# Patient Record
Sex: MALE | Race: WHITE | NOT HISPANIC OR LATINO | Employment: FULL TIME | URBAN - METROPOLITAN AREA
[De-identification: names, ages, dates, MRNs, and addresses within clinical notes are randomized per-mention and may not be internally consistent; named-entity substitution may affect disease eponyms.]

---

## 2017-05-30 ENCOUNTER — HISTORICAL (OUTPATIENT)
Dept: LAB | Facility: HOSPITAL | Age: 62
End: 2017-05-30

## 2017-05-30 LAB — PSA SERPL-MCNC: 1.68 NG/ML (ref 0–4)

## 2017-07-23 ENCOUNTER — OFFICE VISIT (OUTPATIENT)
Dept: URGENT CARE | Facility: CLINIC | Age: 62
End: 2017-07-23
Payer: COMMERCIAL

## 2017-07-23 VITALS
SYSTOLIC BLOOD PRESSURE: 142 MMHG | BODY MASS INDEX: 37.01 KG/M2 | WEIGHT: 235.81 LBS | OXYGEN SATURATION: 97 % | HEIGHT: 67 IN | HEART RATE: 85 BPM | TEMPERATURE: 99 F | DIASTOLIC BLOOD PRESSURE: 76 MMHG

## 2017-07-23 DIAGNOSIS — R31.9 URINARY TRACT INFECTION WITH HEMATURIA, SITE UNSPECIFIED: ICD-10-CM

## 2017-07-23 DIAGNOSIS — R10.9 ABDOMINAL PAIN, ACUTE: Primary | ICD-10-CM

## 2017-07-23 DIAGNOSIS — N39.0 URINARY TRACT INFECTION WITH HEMATURIA, SITE UNSPECIFIED: ICD-10-CM

## 2017-07-23 LAB
BILIRUB SERPL-MCNC: NEGATIVE MG/DL
BLOOD, POC UA: 250
GLUCOSE UR QL STRIP: NORMAL
KETONES UR QL STRIP: NEGATIVE
LEUKOCYTE ESTERASE URINE, POC: 2
NITRITE, POC UA: NEGATIVE
PH, POC UA: 7
PROTEIN, POC: ABNORMAL
SPECIFIC GRAVITY, POC UA: 1
UROBILINOGEN, POC UA: NORMAL

## 2017-07-23 PROCEDURE — 87186 SC STD MICRODIL/AGAR DIL: CPT

## 2017-07-23 PROCEDURE — 99203 OFFICE O/P NEW LOW 30 MIN: CPT | Mod: 25,S$GLB,, | Performed by: PHYSICIAN ASSISTANT

## 2017-07-23 PROCEDURE — 87077 CULTURE AEROBIC IDENTIFY: CPT

## 2017-07-23 PROCEDURE — 3008F BODY MASS INDEX DOCD: CPT | Mod: S$GLB,,, | Performed by: PHYSICIAN ASSISTANT

## 2017-07-23 PROCEDURE — 81000 URINALYSIS NONAUTO W/SCOPE: CPT | Mod: S$GLB,,, | Performed by: PHYSICIAN ASSISTANT

## 2017-07-23 PROCEDURE — 87088 URINE BACTERIA CULTURE: CPT

## 2017-07-23 PROCEDURE — 87086 URINE CULTURE/COLONY COUNT: CPT

## 2017-07-23 PROCEDURE — 99999 PR PBB SHADOW E&M-NEW PATIENT-LVL V: CPT | Mod: PBBFAC,,, | Performed by: PHYSICIAN ASSISTANT

## 2017-07-23 RX ORDER — LISINOPRIL 10 MG/1
TABLET ORAL
COMMUNITY
Start: 2017-06-01 | End: 2022-05-17 | Stop reason: DRUGHIGH

## 2017-07-23 RX ORDER — TAMSULOSIN HYDROCHLORIDE 0.4 MG/1
CAPSULE ORAL
COMMUNITY
Start: 2017-05-05

## 2017-07-23 RX ORDER — ASPIRIN 81 MG/1
81 TABLET ORAL DAILY
COMMUNITY

## 2017-07-23 RX ORDER — SILVER SULFADIAZINE 10 G/1000G
CREAM TOPICAL
COMMUNITY
Start: 2017-07-18 | End: 2022-11-30 | Stop reason: ALTCHOICE

## 2017-07-23 RX ORDER — LOVASTATIN 20 MG/1
TABLET ORAL
COMMUNITY
Start: 2017-07-23 | End: 2022-05-17 | Stop reason: DRUGHIGH

## 2017-07-23 RX ORDER — CLINDAMYCIN PHOSPHATE 11.9 MG/ML
SOLUTION TOPICAL 2 TIMES DAILY
COMMUNITY
End: 2023-12-04 | Stop reason: ALTCHOICE

## 2017-07-23 RX ORDER — POLYETHYLENE GLYCOL 3350 17 G/17G
17 POWDER, FOR SOLUTION ORAL 2 TIMES DAILY
Qty: 340 G | Refills: 0 | Status: SHIPPED | OUTPATIENT
Start: 2017-07-23 | End: 2017-08-02

## 2017-07-23 RX ORDER — CIPROFLOXACIN 500 MG/1
500 TABLET ORAL EVERY 12 HOURS
Qty: 20 TABLET | Refills: 0 | Status: SHIPPED | OUTPATIENT
Start: 2017-07-23 | End: 2017-08-02

## 2017-07-23 RX ORDER — METRONIDAZOLE 500 MG/1
500 TABLET ORAL EVERY 8 HOURS
Qty: 30 TABLET | Refills: 0 | Status: SHIPPED | OUTPATIENT
Start: 2017-07-23 | End: 2017-08-02

## 2017-07-23 RX ORDER — HYDROGEN PEROXIDE 3 %
20 SOLUTION, NON-ORAL MISCELLANEOUS
COMMUNITY

## 2017-07-23 NOTE — PROGRESS NOTES
Subjective:       Patient ID: Eugene Gayle is a 61 y.o. male.    Chief Complaint: Abdominal Pain    Abdominal Pain   This is a new problem. The current episode started in the past 7 days. The onset quality is gradual. The problem occurs constantly. The problem has been unchanged. The pain is located in the LLQ. The pain is moderate. The quality of the pain is aching. The abdominal pain does not radiate. Associated symptoms include dysuria, a fever, frequency, headaches and nausea. Pertinent negatives include no constipation, diarrhea, hematuria or melena. Exacerbated by: Ate tomatoes over the weekend. The pain is relieved by nothing. He has tried nothing for the symptoms.     Review of Systems   Constitutional: Positive for chills and fever. Negative for fatigue.   Respiratory: Negative for chest tightness and shortness of breath.    Cardiovascular: Negative for chest pain.   Gastrointestinal: Positive for abdominal pain and nausea. Negative for constipation, diarrhea and melena.   Genitourinary: Positive for dysuria and frequency. Negative for flank pain and hematuria.   Neurological: Positive for headaches.       Objective:      Physical Exam   Constitutional: He appears well-developed and well-nourished. No distress.   Cardiovascular: Normal rate and regular rhythm.  Exam reveals no gallop and no friction rub.    No murmur heard.  Pulmonary/Chest: Effort normal and breath sounds normal. No respiratory distress. He has no wheezes. He has no rales. He exhibits no tenderness.   Abdominal: Normal appearance and bowel sounds are normal. There is no hepatosplenomegaly. There is tenderness in the suprapubic area and left lower quadrant. There is no rigidity, no rebound, no guarding, no CVA tenderness, no tenderness at McBurney's point and negative Kelley's sign.   Skin: He is not diaphoretic.   Nursing note and vitals reviewed.      Assessment:       1. Abdominal pain, acute        Plan:       Abdominal pain,  acute  -     POCT URINALYSIS  -     Urine culture    Urinary tract infection with hematuria, site unspecified    Other orders  -     ciprofloxacin HCl (CIPRO) 500 MG tablet; Take 1 tablet (500 mg total) by mouth every 12 (twelve) hours.  Dispense: 20 tablet; Refill: 0  -     metronidazole (FLAGYL) 500 MG tablet; Take 1 tablet (500 mg total) by mouth every 8 (eight) hours.  Dispense: 30 tablet; Refill: 0  -     polyethylene glycol (GLYCOLAX) 17 gram/dose powder; Take 17 g by mouth 2 (two) times daily.  Dispense: 340 g; Refill: 0

## 2017-07-23 NOTE — PATIENT INSTRUCTIONS
Clear liquids to a soft bland diet as tolerated. Take tylenol as needed for fever. Avoid alcohol and coffee.  Go to the emergency room if symptoms do not improve in 24 to 48 hours.  Follow up with your doctor next week.

## 2017-07-25 LAB — BACTERIA UR CULT: NORMAL

## 2017-09-03 ENCOUNTER — HOSPITAL ENCOUNTER (EMERGENCY)
Facility: HOSPITAL | Age: 62
Discharge: HOME | End: 2017-09-03
Attending: EMERGENCY MEDICINE
Payer: COMMERCIAL

## 2017-09-03 VITALS
WEIGHT: 236 LBS | RESPIRATION RATE: 20 BRPM | HEIGHT: 67 IN | BODY MASS INDEX: 37.04 KG/M2 | HEART RATE: 97 BPM | DIASTOLIC BLOOD PRESSURE: 88 MMHG | OXYGEN SATURATION: 100 % | SYSTOLIC BLOOD PRESSURE: 147 MMHG | TEMPERATURE: 99 F

## 2017-09-03 DIAGNOSIS — M79.5 FOREIGN BODY (FB) IN SOFT TISSUE: ICD-10-CM

## 2017-09-03 DIAGNOSIS — S31.139A PUNCTURE WOUND OF ABDOMEN, INITIAL ENCOUNTER: Primary | ICD-10-CM

## 2017-09-03 PROCEDURE — 99283 EMERGENCY DEPT VISIT LOW MDM: CPT

## 2017-09-03 NOTE — ED PROVIDER NOTES
Encounter Date: 9/3/2017       History     Chief Complaint   Patient presents with    Laceration     Pt at work and cut by a peice of flying steel on his left lower abdomen.     62 year old male with complaint of puncture wound to left abdomen X two hours.  Pt reports that he was working on piece of iron and and pulling on wrench to loosen iron and steel broke and struck left side of abdomen.  Reports that foreign body may be in tissue. No abdominal pain at present. Reports last tetanus shot was 5 years ago.           Review of patient's allergies indicates:  No Known Allergies  Past Medical History:   Diagnosis Date    Hyperlipidemia     Hypertension      History reviewed. No pertinent surgical history.  History reviewed. No pertinent family history.  Social History   Substance Use Topics    Smoking status: Never Smoker    Smokeless tobacco: Never Used    Alcohol use 1.2 oz/week     2 Cans of beer per week     Review of Systems   Constitutional: Negative for fever.   HENT: Negative for sore throat.    Respiratory: Negative for shortness of breath.    Cardiovascular: Negative for chest pain.   Gastrointestinal: Negative for nausea.   Genitourinary: Negative for dysuria.   Musculoskeletal: Negative for back pain.   Skin: Negative for rash.        Wound to abdomen   Neurological: Negative for weakness.   Hematological: Does not bruise/bleed easily.       Physical Exam     Initial Vitals [09/03/17 1139]   BP Pulse Resp Temp SpO2   (!) 147/88 97 20 98.5 °F (36.9 °C) 100 %      MAP       107.67         Physical Exam    Nursing note and vitals reviewed.  Constitutional: He appears well-developed and well-nourished.   HENT:   Head: Normocephalic and atraumatic.   Eyes: Conjunctivae are normal. Pupils are equal, round, and reactive to light.   Neck: Normal range of motion. Neck supple.   Cardiovascular: Normal rate, regular rhythm, normal heart sounds and intact distal pulses.   Pulmonary/Chest: Breath sounds normal.    Abdominal: Soft. There is no tenderness. There is no rebound and no guarding.   Musculoskeletal: Normal range of motion.   Neurological: He is alert.   Skin: Skin is warm and dry.   2 mm puncture wound left lower lateral abdomen, no active bleeding   Psychiatric: He has a normal mood and affect. His behavior is normal. Thought content normal.         ED Course   Procedures  Labs Reviewed - No data to display       X-Rays:   Independently Interpreted Readings:   Other Readings:  KUB: no foreign bodies        Imaging Results          X-Ray Abdomen AP 1 View (KUB) (In process)                                ED Course      Clinical Impression:   The primary encounter diagnosis was Puncture wound of abdomen, initial encounter. A diagnosis of Foreign body (FB) in soft tissue was also pertinent to this visit.                           Juan Abdul NP  09/03/17 1233       Juan Abdul NP  09/03/17 1233

## 2017-09-21 ENCOUNTER — HISTORICAL (OUTPATIENT)
Dept: RADIOLOGY | Facility: HOSPITAL | Age: 62
End: 2017-09-21

## 2018-03-27 ENCOUNTER — HISTORICAL (OUTPATIENT)
Dept: ADMINISTRATIVE | Facility: HOSPITAL | Age: 63
End: 2018-03-27

## 2018-04-21 ENCOUNTER — OFFICE VISIT (OUTPATIENT)
Dept: URGENT CARE | Facility: CLINIC | Age: 63
End: 2018-04-21
Payer: COMMERCIAL

## 2018-04-21 VITALS
SYSTOLIC BLOOD PRESSURE: 122 MMHG | HEIGHT: 67 IN | WEIGHT: 235.88 LBS | DIASTOLIC BLOOD PRESSURE: 80 MMHG | TEMPERATURE: 98 F | RESPIRATION RATE: 18 BRPM | HEART RATE: 82 BPM | BODY MASS INDEX: 37.02 KG/M2 | OXYGEN SATURATION: 96 %

## 2018-04-21 DIAGNOSIS — J06.9 VIRAL UPPER RESPIRATORY TRACT INFECTION: Primary | ICD-10-CM

## 2018-04-21 PROCEDURE — 99214 OFFICE O/P EST MOD 30 MIN: CPT | Mod: S$GLB,,, | Performed by: FAMILY MEDICINE

## 2018-04-21 PROCEDURE — 99999 PR PBB SHADOW E&M-EST. PATIENT-LVL III: CPT | Mod: PBBFAC,,,

## 2018-04-21 RX ORDER — LORATADINE 10 MG/1
10 TABLET ORAL DAILY
Refills: 0 | COMMUNITY
Start: 2018-04-21 | End: 2022-10-24

## 2018-04-21 RX ORDER — PROMETHAZINE HYDROCHLORIDE AND DEXTROMETHORPHAN HYDROBROMIDE 6.25; 15 MG/5ML; MG/5ML
5 SYRUP ORAL NIGHTLY
Qty: 118 ML | Refills: 0 | Status: SHIPPED | OUTPATIENT
Start: 2018-04-21 | End: 2022-11-30 | Stop reason: ALTCHOICE

## 2018-04-21 RX ORDER — FLUTICASONE PROPIONATE 50 MCG
1 SPRAY, SUSPENSION (ML) NASAL DAILY
Qty: 16 G | Refills: 5 | Status: SHIPPED | OUTPATIENT
Start: 2018-04-21

## 2018-04-21 NOTE — PROGRESS NOTES
Subjective:       Patient ID: Eugene Gayle Jr. is a 62 y.o. male.    Chief Complaint: Cough and Sinus Problem    Hx of PNA      URI    This is a new problem. The current episode started yesterday. The problem has been gradually worsening. There has been no fever. Associated symptoms include coughing, sneezing and a sore throat. Pertinent negatives include no abdominal pain, chest pain, congestion, headaches, rhinorrhea, sinus pain, vomiting or wheezing. Treatments tried: mucinex.     Review of Systems   HENT: Positive for sneezing and sore throat. Negative for congestion, rhinorrhea and sinus pain.    Respiratory: Positive for cough. Negative for wheezing.    Cardiovascular: Negative for chest pain.   Gastrointestinal: Negative for abdominal pain and vomiting.   Neurological: Negative for headaches.       Objective:      Physical Exam   Constitutional: He appears well-developed and well-nourished. No distress.   HENT:   Head: Normocephalic and atraumatic.   Nose: Right sinus exhibits no maxillary sinus tenderness and no frontal sinus tenderness. Left sinus exhibits no maxillary sinus tenderness and no frontal sinus tenderness.   Pulmonary/Chest: Effort normal and breath sounds normal. No respiratory distress. He has no wheezes.   Skin: Skin is warm and dry. No rash noted. He is not diaphoretic. No erythema.   Nursing note and vitals reviewed.      Assessment:       1. Viral upper respiratory tract infection        Plan:     Problem List Items Addressed This Visit        ENT    Viral upper respiratory tract infection - Primary    Current Assessment & Plan     Pt took medrol dose pack 3 weeks, no steroids today         Relevant Medications    loratadine (CLARITIN) 10 mg tablet    promethazine-dextromethorphan (PROMETHAZINE-DM) 6.25-15 mg/5 mL Syrp    fluticasone (FLONASE) 50 mcg/actuation nasal spray

## 2019-02-05 ENCOUNTER — HISTORICAL (OUTPATIENT)
Dept: LAB | Facility: HOSPITAL | Age: 64
End: 2019-02-05

## 2019-02-05 LAB
ALBUMIN SERPL-MCNC: 4 GM/DL (ref 3.4–5)
ALP SERPL-CCNC: 61 UNIT/L (ref 46–116)
ALT SERPL-CCNC: 29 UNIT/L (ref 12–78)
AST SERPL-CCNC: 18 UNIT/L (ref 15–37)
BILIRUB SERPL-MCNC: 0.6 MG/DL (ref 0.2–1)
BILIRUBIN DIRECT+TOT PNL SERPL-MCNC: 0.13 MG/DL (ref 0–0.2)
BILIRUBIN DIRECT+TOT PNL SERPL-MCNC: 0.47 MG/DL (ref 0–0.8)
BUN SERPL-MCNC: 21.9 MG/DL (ref 7–18)
CALCIUM SERPL-MCNC: 8.9 MG/DL (ref 8.5–10.1)
CHLORIDE SERPL-SCNC: 101 MMOL/L (ref 98–107)
CHOLEST SERPL-MCNC: 177 MG/DL (ref 0–200)
CHOLEST/HDLC SERPL: 3.2 {RATIO} (ref 0–5)
CO2 SERPL-SCNC: 27.2 MMOL/L (ref 21–32)
CREAT SERPL-MCNC: 0.86 MG/DL (ref 0.6–1.3)
CREAT/UREA NIT SERPL: 25
GLUCOSE SERPL-MCNC: 114 MG/DL (ref 74–106)
HDLC SERPL-MCNC: 56 MG/DL (ref 40–60)
LDLC SERPL CALC-MCNC: 93 MG/DL (ref 0–129)
POTASSIUM SERPL-SCNC: 4.1 MMOL/L (ref 3.5–5.1)
PROT SERPL-MCNC: 6.6 GM/DL (ref 6.4–8.2)
PSA SERPL-MCNC: 1.53 NG/ML (ref 0–4)
SODIUM SERPL-SCNC: 138 MMOL/L (ref 136–145)
TRIGL SERPL-MCNC: 139 MG/DL
VLDLC SERPL CALC-MCNC: 28 MG/DL

## 2019-02-12 ENCOUNTER — HISTORICAL (OUTPATIENT)
Dept: LAB | Facility: HOSPITAL | Age: 64
End: 2019-02-12

## 2019-02-12 LAB
EST. AVERAGE GLUCOSE BLD GHB EST-MCNC: 126 MG/DL
HBA1C MFR BLD: 6 % (ref 4.5–6.2)

## 2019-07-23 ENCOUNTER — HISTORICAL (OUTPATIENT)
Dept: OCCUPATIONAL THERAPY | Facility: HOSPITAL | Age: 64
End: 2019-07-23

## 2019-07-29 ENCOUNTER — HISTORICAL (OUTPATIENT)
Dept: OCCUPATIONAL THERAPY | Facility: HOSPITAL | Age: 64
End: 2019-07-29

## 2019-07-31 ENCOUNTER — HISTORICAL (OUTPATIENT)
Dept: OCCUPATIONAL THERAPY | Facility: HOSPITAL | Age: 64
End: 2019-07-31

## 2019-08-05 ENCOUNTER — HISTORICAL (OUTPATIENT)
Dept: OCCUPATIONAL THERAPY | Facility: HOSPITAL | Age: 64
End: 2019-08-05

## 2019-08-07 ENCOUNTER — HISTORICAL (OUTPATIENT)
Dept: OCCUPATIONAL THERAPY | Facility: HOSPITAL | Age: 64
End: 2019-08-07

## 2019-08-12 ENCOUNTER — HISTORICAL (OUTPATIENT)
Dept: OCCUPATIONAL THERAPY | Facility: HOSPITAL | Age: 64
End: 2019-08-12

## 2019-08-14 ENCOUNTER — HISTORICAL (OUTPATIENT)
Dept: OCCUPATIONAL THERAPY | Facility: HOSPITAL | Age: 64
End: 2019-08-14

## 2019-08-19 ENCOUNTER — HISTORICAL (OUTPATIENT)
Dept: OCCUPATIONAL THERAPY | Facility: HOSPITAL | Age: 64
End: 2019-08-19

## 2019-08-21 ENCOUNTER — HISTORICAL (OUTPATIENT)
Dept: OCCUPATIONAL THERAPY | Facility: HOSPITAL | Age: 64
End: 2019-08-21

## 2019-08-26 ENCOUNTER — HISTORICAL (OUTPATIENT)
Dept: OCCUPATIONAL THERAPY | Facility: HOSPITAL | Age: 64
End: 2019-08-26

## 2019-08-28 ENCOUNTER — HISTORICAL (OUTPATIENT)
Dept: OCCUPATIONAL THERAPY | Facility: HOSPITAL | Age: 64
End: 2019-08-28

## 2019-09-04 ENCOUNTER — HISTORICAL (OUTPATIENT)
Dept: OCCUPATIONAL THERAPY | Facility: HOSPITAL | Age: 64
End: 2019-09-04

## 2019-09-06 ENCOUNTER — HISTORICAL (OUTPATIENT)
Dept: OCCUPATIONAL THERAPY | Facility: HOSPITAL | Age: 64
End: 2019-09-06

## 2019-10-04 ENCOUNTER — HISTORICAL (OUTPATIENT)
Dept: LAB | Facility: HOSPITAL | Age: 64
End: 2019-10-04

## 2019-10-04 LAB — HBA1C MFR BLD: 5.4 % (ref 4.8–5.6)

## 2019-10-24 LAB — CRC RECOMMENDATION EXT: NORMAL

## 2019-11-14 ENCOUNTER — HISTORICAL (OUTPATIENT)
Dept: ADMINISTRATIVE | Facility: HOSPITAL | Age: 64
End: 2019-11-14

## 2019-11-14 LAB
BASOPHILS # BLD AUTO: 0 X10E3/UL (ref 0–0.2)
BASOPHILS NFR BLD AUTO: 1 %
EOSINOPHIL # BLD AUTO: 0.2 X10E3/UL (ref 0–0.4)
EOSINOPHIL NFR BLD AUTO: 4 %
ERYTHROCYTE [DISTWIDTH] IN BLOOD BY AUTOMATED COUNT: 12.3 % (ref 12.3–15.4)
HCT VFR BLD AUTO: 39.5 % (ref 37.5–51)
HGB BLD-MCNC: 12.5 G/DL (ref 13–17.7)
LYMPHOCYTES # BLD AUTO: 1.4 X10E3/UL (ref 0.7–3.1)
LYMPHOCYTES NFR BLD AUTO: 27 %
MCH RBC QN AUTO: 28.3 PG (ref 26.6–33)
MCHC RBC AUTO-ENTMCNC: 31.6 G/DL (ref 31.5–35.7)
MCV RBC AUTO: 89 FL (ref 79–97)
MONOCYTES # BLD AUTO: 0.5 X10E3/UL (ref 0.1–0.9)
MONOCYTES NFR BLD AUTO: 10 %
NEUTROPHILS # BLD AUTO: 3.1 X10E3/UL (ref 1.4–7)
NEUTROPHILS NFR BLD AUTO: 58 %
PLATELET # BLD AUTO: 167 X10E3/UL (ref 150–450)
RBC # BLD AUTO: 4.42 X10(6)/MCL (ref 4.14–5.8)
WBC # SPEC AUTO: 5.2 X10E3/UL (ref 3.4–10.8)

## 2020-01-02 ENCOUNTER — HISTORICAL (OUTPATIENT)
Dept: LAB | Facility: HOSPITAL | Age: 65
End: 2020-01-02

## 2020-12-15 LAB
ALBUMIN SERPL-MCNC: 4.6 G/DL (ref 3.8–4.8)
ALBUMIN/GLOB SERPL: 2.2 {RATIO} (ref 1.2–2.2)
ALP SERPL-CCNC: 66 IU/L (ref 39–117)
ALT SERPL-CCNC: 19 IU/L (ref 0–44)
AST SERPL-CCNC: 19 IU/L (ref 0–40)
BASOPHILS # BLD AUTO: 0.1 X10E3/UL (ref 0–0.2)
BASOPHILS NFR BLD AUTO: 1 %
BILIRUB SERPL-MCNC: 0.4 MG/DL (ref 0–1.2)
BUN SERPL-MCNC: 20 MG/DL (ref 8–27)
CALCIUM SERPL-MCNC: 9.2 MG/DL (ref 8.6–10.2)
CHLORIDE SERPL-SCNC: 104 MMOL/L (ref 96–106)
CHOLEST SERPL-MCNC: 163 MG/DL (ref 100–199)
CHOLEST/HDLC SERPL: 2.5 RATIO (ref 0–5)
CO2 SERPL-SCNC: 25 MMOL/L (ref 20–29)
CREAT SERPL-MCNC: 1.03 MG/DL (ref 0.76–1.27)
CREAT/UREA NIT SERPL: 19 (ref 10–24)
EOSINOPHIL # BLD AUTO: 0.2 X10E3/UL (ref 0–0.4)
EOSINOPHIL NFR BLD AUTO: 2 %
ERYTHROCYTE [DISTWIDTH] IN BLOOD BY AUTOMATED COUNT: 13.4 % (ref 11.6–15.4)
GLOBULIN SER-MCNC: 2.1 G/DL (ref 1.5–4.5)
GLUCOSE SERPL-MCNC: 108 MG/DL (ref 65–99)
HBA1C MFR BLD: 5.9 % (ref 4.8–5.6)
HCT VFR BLD AUTO: 48.7 % (ref 37.5–51)
HDLC SERPL-MCNC: 64 MG/DL
HGB BLD-MCNC: 15.4 G/DL (ref 13–17.7)
LDLC SERPL CALC-MCNC: 79 MG/DL (ref 0–99)
LYMPHOCYTES # BLD AUTO: 1.9 X10E3/UL (ref 0.7–3.1)
LYMPHOCYTES NFR BLD AUTO: 24 %
MCH RBC QN AUTO: 27.7 PG (ref 26.6–33)
MCHC RBC AUTO-ENTMCNC: 31.6 G/DL (ref 31.5–35.7)
MCV RBC AUTO: 88 FL (ref 79–97)
MONOCYTES # BLD AUTO: 0.7 X10E3/UL (ref 0.1–0.9)
MONOCYTES NFR BLD AUTO: 9 %
NEUTROPHILS # BLD AUTO: 5.1 X10E3/UL (ref 1.4–7)
NEUTROPHILS NFR BLD AUTO: 64 %
PLATELET # BLD AUTO: 187 X10E3/UL (ref 150–450)
POTASSIUM SERPL-SCNC: 5.3 MMOL/L (ref 3.5–5.2)
PROT SERPL-MCNC: 6.7 G/DL (ref 6–8.5)
RBC # BLD AUTO: 5.56 X10(6)/MCL (ref 4.14–5.8)
SODIUM SERPL-SCNC: 141 MMOL/L (ref 134–144)
TRIGL SERPL-MCNC: 116 MG/DL (ref 0–149)
VLDLC SERPL CALC-MCNC: 20 MG/DL (ref 5–40)
WBC # SPEC AUTO: 7.9 X10E3/UL (ref 3.4–10.8)

## 2022-04-11 ENCOUNTER — HISTORICAL (OUTPATIENT)
Dept: ADMINISTRATIVE | Facility: HOSPITAL | Age: 67
End: 2022-04-11
Payer: MEDICARE

## 2022-04-24 VITALS
BODY MASS INDEX: 34.37 KG/M2 | WEIGHT: 219 LBS | OXYGEN SATURATION: 95 % | DIASTOLIC BLOOD PRESSURE: 68 MMHG | SYSTOLIC BLOOD PRESSURE: 113 MMHG | HEIGHT: 67 IN

## 2022-05-08 ENCOUNTER — HISTORICAL (OUTPATIENT)
Dept: ADMINISTRATIVE | Facility: HOSPITAL | Age: 67
End: 2022-05-08
Payer: MEDICARE

## 2022-05-17 ENCOUNTER — OFFICE VISIT (OUTPATIENT)
Dept: PRIMARY CARE CLINIC | Facility: CLINIC | Age: 67
End: 2022-05-17
Payer: MEDICARE

## 2022-05-17 DIAGNOSIS — I10 PRIMARY HYPERTENSION: Primary | ICD-10-CM

## 2022-05-17 PROBLEM — N43.3 ACQUIRED HYDROCELE: Status: ACTIVE | Noted: 2022-05-17

## 2022-05-17 PROBLEM — R73.03 PREDIABETES: Status: ACTIVE | Noted: 2019-02-12

## 2022-05-17 PROBLEM — I25.10 ARTERIOSCLEROSIS OF CORONARY ARTERY: Status: ACTIVE | Noted: 2022-05-17

## 2022-05-17 PROBLEM — K52.9 POSTPRANDIAL DIARRHEA: Status: ACTIVE | Noted: 2022-05-17

## 2022-05-17 PROBLEM — G47.33 OBSTRUCTIVE SLEEP APNEA SYNDROME: Status: ACTIVE | Noted: 2022-05-17

## 2022-05-17 PROBLEM — N48.6 INDURATIO PENIS PLASTICA: Status: ACTIVE | Noted: 2022-05-17

## 2022-05-17 PROBLEM — E66.9 OBESITY: Status: ACTIVE | Noted: 2022-05-17

## 2022-05-17 PROBLEM — H26.9 CATARACT OF BOTH EYES: Status: ACTIVE | Noted: 2022-05-17

## 2022-05-17 PROCEDURE — 99499 UNLISTED E&M SERVICE: CPT | Mod: ,,, | Performed by: INTERNAL MEDICINE

## 2022-05-17 PROCEDURE — 99499 NO LOS: ICD-10-PCS | Mod: ,,, | Performed by: INTERNAL MEDICINE

## 2022-05-17 RX ORDER — LOVASTATIN 40 MG/1
40 TABLET ORAL NIGHTLY
COMMUNITY
Start: 2022-01-24 | End: 2022-05-23 | Stop reason: SDUPTHER

## 2022-05-17 RX ORDER — LISINOPRIL 20 MG/1
20 TABLET ORAL DAILY
COMMUNITY
Start: 2022-04-01

## 2022-05-17 RX ORDER — DEXBROMPHENIRAMINE MALEATE, PHENYLEPHRINE HYDROCHLORIDE 2; 7.5 MG/1; MG/1
1 TABLET ORAL 3 TIMES DAILY
COMMUNITY
Start: 2021-12-02 | End: 2022-10-24

## 2022-05-17 RX ORDER — MONTELUKAST SODIUM 10 MG/1
10 TABLET ORAL NIGHTLY
COMMUNITY
Start: 2021-12-21 | End: 2022-05-25

## 2022-05-23 ENCOUNTER — OFFICE VISIT (OUTPATIENT)
Dept: PRIMARY CARE CLINIC | Facility: CLINIC | Age: 67
End: 2022-05-23
Payer: MEDICARE

## 2022-05-23 VITALS
DIASTOLIC BLOOD PRESSURE: 72 MMHG | HEIGHT: 67 IN | SYSTOLIC BLOOD PRESSURE: 125 MMHG | WEIGHT: 212 LBS | OXYGEN SATURATION: 95 % | HEART RATE: 76 BPM | TEMPERATURE: 98 F | RESPIRATION RATE: 16 BRPM | BODY MASS INDEX: 33.27 KG/M2

## 2022-05-23 DIAGNOSIS — I10 PRIMARY HYPERTENSION: Primary | ICD-10-CM

## 2022-05-23 DIAGNOSIS — E78.00 HYPERCHOLESTEREMIA: ICD-10-CM

## 2022-05-23 DIAGNOSIS — R73.03 PREDIABETES: ICD-10-CM

## 2022-05-23 DIAGNOSIS — I25.10 CORONARY ARTERY DISEASE, UNSPECIFIED VESSEL OR LESION TYPE, UNSPECIFIED WHETHER ANGINA PRESENT, UNSPECIFIED WHETHER NATIVE OR TRANSPLANTED HEART: ICD-10-CM

## 2022-05-23 DIAGNOSIS — G47.33 OBSTRUCTIVE SLEEP APNEA: ICD-10-CM

## 2022-05-23 DIAGNOSIS — R63.4 WEIGHT LOSS: ICD-10-CM

## 2022-05-23 DIAGNOSIS — N40.1 BPH WITH OBSTRUCTION/LOWER URINARY TRACT SYMPTOMS: ICD-10-CM

## 2022-05-23 DIAGNOSIS — N13.8 BPH WITH OBSTRUCTION/LOWER URINARY TRACT SYMPTOMS: ICD-10-CM

## 2022-05-23 PROBLEM — N35.919 URETHRAL STRICTURE: Status: ACTIVE | Noted: 2022-05-23

## 2022-05-23 PROBLEM — N40.0 BENIGN PROSTATIC HYPERPLASIA: Status: ACTIVE | Noted: 2022-05-23

## 2022-05-23 PROBLEM — N45.3 ORCHITIS AND EPIDIDYMITIS: Status: ACTIVE | Noted: 2022-05-23

## 2022-05-23 PROBLEM — R31.29 MICROSCOPIC HEMATURIA: Status: ACTIVE | Noted: 2022-05-23

## 2022-05-23 PROCEDURE — 99213 PR OFFICE/OUTPT VISIT, EST, LEVL III, 20-29 MIN: ICD-10-PCS | Mod: ,,, | Performed by: INTERNAL MEDICINE

## 2022-05-23 PROCEDURE — 99213 OFFICE O/P EST LOW 20 MIN: CPT | Mod: ,,, | Performed by: INTERNAL MEDICINE

## 2022-05-23 RX ORDER — LANOLIN ALCOHOL/MO/W.PET/CERES
400 CREAM (GRAM) TOPICAL DAILY
COMMUNITY

## 2022-05-23 RX ORDER — OMEPRAZOLE 40 MG/1
40 CAPSULE, DELAYED RELEASE ORAL
COMMUNITY
End: 2023-01-30

## 2022-05-23 RX ORDER — GUAIFENESIN 600 MG/1
TABLET, EXTENDED RELEASE ORAL
COMMUNITY
End: 2022-10-24

## 2022-05-23 RX ORDER — GLUCOSAMINE/CHONDRO SU A 500-400 MG
1 TABLET ORAL 3 TIMES DAILY
COMMUNITY

## 2022-05-23 RX ORDER — LOVASTATIN 40 MG/1
40 TABLET ORAL NIGHTLY
Qty: 90 TABLET | Refills: 3 | Status: SHIPPED | OUTPATIENT
Start: 2022-05-23 | End: 2023-03-12

## 2022-05-23 NOTE — PROGRESS NOTES
Dayan Osullivan MD   [address] [place] [address]     PATIENT NAME: Eugene Gayle Jr.  : 1955  DATE: 22  MRN: [id number]      Billing Provider: Dayan Osullivan MD  Level of Service: [place] OFFICE/OUTPT VISIT, EST, LEVL III, 20-29 MIN  Patient PCP Information     Provider PCP Type    Dayan Osullivan MD General          Reason for Visit / Chief Complaint: No chief complaint on file.       Update PCP  Update Chief Complaint         History of Present Illness / Problem Focused Workflow     Eugene Gayle Jr. presents to the clinic with No chief complaint on file.     Here for follow up of HTN, Prediabetes, CAD. He has a new job and he walks a lot so he's lost 40 pounds. He is feeling good. He saw his cardiologist and he upped his lovastatin to 40 mg. He had lost his pills but he had left it at his work apartment. He's trying to transfer to [employer] locally, he's been past [place] but doing well there.       Review of Systems     Review of Systems   Constitutional: Negative for activity change and unexpected weight change.   HENT: Positive for rhinorrhea. Negative for hearing loss and trouble swallowing.    Eyes: Positive for discharge and visual disturbance.        Sees Eye doctor tomorrow, told it is tear duct issue, he will need work done on it.    Respiratory: Negative for chest tightness and wheezing.    Cardiovascular: Negative for chest pain and palpitations.   Gastrointestinal: Negative for blood in stool, constipation, diarrhea and vomiting.   Endocrine: Negative for polydipsia and polyuria.   Genitourinary: Negative for difficulty urinating, hematuria and urgency.        Has follow up with urology, not sure if PSA due then.    Musculoskeletal: Negative for arthralgias, joint swelling and neck pain.   Neurological: Negative for weakness and headaches.   Psychiatric/Behavioral: Negative for confusion and dysphoric mood.       Medical / Social / Family History     Past Medical History:    Diagnosis Date    CAD (coronary artery disease)     GERD (gastroesophageal reflux disease)     Hyperlipidemia     Hypertension     Obesity, unspecified     ASHA on CPAP     Partial thickness rotator cuff tear     Pneumonia, unspecified organism     Postprandial diarrhea     Prediabetes     Skin cancer        Past Surgical History:   Procedure Laterality Date    APPENDECTOMY      BRONCHOSCOPY      CARDIAC CATHETERIZATION      COLONOSCOPY  10/24/2019    FINGER SURGERY      ROTATOR CUFF REPAIR  05/24/2018    SINUS SURGERY         Social History  Mr. Gayle      reports that he has quit smoking. He has never used smokeless tobacco. He reports current alcohol use of about 2.0 standard drinks of alcohol per week. He reports that he does not use drugs.    Family History  Mr.'s Gayle   family history includes COPD in his brother and mother; Heart disease in his mother; Hypertension in his father and mother; Kidney failure in his mother; Lung cancer in his father; Prostate cancer in his father.    Medications and Allergies     Medications  Outpatient Medications Marked as Taking for the 5/23/22 encounter (Office Visit) with Dayan Osullivan MD   Medication Sig Dispense Refill    aspirin (ECOTRIN) 81 MG EC tablet Take 81 mg by mouth once daily.      esomeprazole (NEXIUM) 20 MG capsule Take 20 mg by mouth before breakfast.      fluticasone (FLONASE) 50 mcg/actuation nasal spray 1 spray (50 mcg total) by Each Nare route once daily. 16 g 5    glucosamine-chondroitin 500-400 mg tablet Take 1 tablet by mouth 3 (three) times daily.      guaiFENesin (MUCINEX) 600 mg 12 hr tablet Take by mouth as needed.      lisinopriL (PRINIVIL,ZESTRIL) 20 MG tablet Take 20 mg by mouth once daily.      magnesium oxide (MAG-OX) 400 mg (241.3 mg magnesium) tablet Take 400 mg by mouth once daily.      montelukast (SINGULAIR) 10 mg tablet Take 10 mg by mouth every evening.      omeprazole (PRILOSEC) 40 MG capsule Take  40 mg by mouth as needed.      tamsulosin (FLOMAX) 0.4 mg Cp24       vitamin E 200 UNIT capsule Take 200 Units by mouth once daily.      [DISCONTINUED] lovastatin (MEVACOR) 40 MG tablet Take 40 mg by mouth nightly.         Allergies  Review of patient's allergies indicates:   Allergen Reactions    Ciprofloxacin Itching and Rash       Physical Examination     Vitals:    05/23/22 1101   BP: 125/72   Pulse: 76   Resp: 16   Temp: 98.1 °F (36.7 °C)     Physical Exam  Constitutional:       Appearance: Normal appearance.   HENT:      Head: Normocephalic.      Right Ear: Tympanic membrane and ear canal normal.      Left Ear: Tympanic membrane and ear canal normal.      Mouth/Throat:      Mouth: Mucous membranes are moist.   Eyes:      Extraocular Movements: Extraocular movements intact.      Pupils: Pupils are equal, round, and reactive to light.   Neck:      Vascular: No carotid bruit.   Cardiovascular:      Rate and Rhythm: Normal rate and regular rhythm.      Pulses: Normal pulses.   Pulmonary:      Effort: Pulmonary effort is normal.   Abdominal:      General: Abdomen is flat.      Palpations: Abdomen is soft. There is no mass.      Hernia: No hernia is present.   Musculoskeletal:         General: No swelling or tenderness.      Cervical back: Neck supple.   Skin:     General: Skin is warm and dry.   Neurological:      General: No focal deficit present.      Mental Status: He is alert.   Psychiatric:         Mood and Affect: Mood normal.           Assessment and Plan (including Health Maintenance)      Problem List  Smart Sets  Document Outside HM   :    Plan:      Refill Lovastatin at 40mg strenght    Health Maintenance Due   Topic Date Due    Hepatitis C Screening  Never done    TETANUS VACCINE  Never done    Colorectal Cancer Screening  Never done    Shingles Vaccine (2 of 3) 04/12/2014    Abdominal Aortic Aneurysm Screening  Never done    COVID-19 Vaccine (3 - Booster for Moderna series) 11/19/2021     Pneumococcal Vaccines (Age 65+) (2 - PPSV23 or PCV20) 12/14/2021       Problem List Items Addressed This Visit        Cardiac/Vascular    Hypertension - Primary    Relevant Orders    Lipid Panel    Comprehensive Metabolic Panel    Urinalysis, Reflex to Urine Culture Urine, Clean Catch       Endocrine    Prediabetes    Relevant Orders    Hemoglobin A1C      Other Visit Diagnoses     BPH with obstruction/lower urinary tract symptoms        Has follow up with Dr Alvarez, he does his PSA    Relevant Orders    Urinalysis, Reflex to Urine Culture Urine, Clean Catch    Obstructive sleep apnea        Relevant Orders    CBC Auto Differential    TSH    Hypercholesteremia        Lipid on return will be on higher dose of Lovastatin 6 mo then    Weight loss        Relevant Orders    CBC Auto Differential    TSH    Coronary artery disease, unspecified vessel or lesion type, unspecified whether angina present, unspecified whether native or transplanted heart        Relevant Orders    Lipid Panel    Comprehensive Metabolic Panel          Health Maintenance Topics with due status: Not Due       Topic Last Completion Date    Lipid Panel 12/15/2020    High Dose Statin 05/23/2022       Future Appointments   Date Time Provider Department Center   11/30/2022  8:20 AM Dayan Osullivan MD [facility] PCP            Signature:  Dayan Osullivan MD  [facility]  [address] [id number]    Date of encounter: 5/23/22

## 2022-08-22 ENCOUNTER — DOCUMENTATION ONLY (OUTPATIENT)
Dept: ADMINISTRATIVE | Facility: HOSPITAL | Age: 67
End: 2022-08-22
Payer: MEDICARE

## 2022-10-24 ENCOUNTER — OFFICE VISIT (OUTPATIENT)
Dept: PULMONOLOGY | Facility: CLINIC | Age: 67
End: 2022-10-24
Payer: MEDICARE

## 2022-10-24 ENCOUNTER — HOSPITAL ENCOUNTER (OUTPATIENT)
Dept: RADIOLOGY | Facility: HOSPITAL | Age: 67
Discharge: HOME | End: 2022-10-24
Attending: INTERNAL MEDICINE
Payer: MEDICARE

## 2022-10-24 VITALS
SYSTOLIC BLOOD PRESSURE: 128 MMHG | HEART RATE: 65 BPM | OXYGEN SATURATION: 98 % | WEIGHT: 212.63 LBS | BODY MASS INDEX: 33.37 KG/M2 | DIASTOLIC BLOOD PRESSURE: 80 MMHG | RESPIRATION RATE: 16 BRPM | HEIGHT: 67 IN

## 2022-10-24 DIAGNOSIS — R06.02 SOBOE (SHORTNESS OF BREATH ON EXERTION): ICD-10-CM

## 2022-10-24 DIAGNOSIS — R09.82 POSTNASAL DRIP: ICD-10-CM

## 2022-10-24 DIAGNOSIS — R05.3 CHRONIC COUGH: ICD-10-CM

## 2022-10-24 DIAGNOSIS — J98.6 ELEVATED DIAPHRAGM: ICD-10-CM

## 2022-10-24 DIAGNOSIS — R06.02 SOBOE (SHORTNESS OF BREATH ON EXERTION): Primary | ICD-10-CM

## 2022-10-24 DIAGNOSIS — G47.33 OSA ON CPAP: ICD-10-CM

## 2022-10-24 PROCEDURE — 71046 X-RAY EXAM CHEST 2 VIEWS: CPT | Mod: TC

## 2022-10-24 PROCEDURE — 99204 OFFICE O/P NEW MOD 45 MIN: CPT | Mod: S$GLB,,, | Performed by: INTERNAL MEDICINE

## 2022-10-24 PROCEDURE — 1101F PT FALLS ASSESS-DOCD LE1/YR: CPT | Mod: CPTII,S$GLB,, | Performed by: INTERNAL MEDICINE

## 2022-10-24 PROCEDURE — 1160F RVW MEDS BY RX/DR IN RCRD: CPT | Mod: CPTII,S$GLB,, | Performed by: INTERNAL MEDICINE

## 2022-10-24 PROCEDURE — 99999 PR PBB SHADOW E&M-EST. PATIENT-LVL IV: ICD-10-PCS | Mod: PBBFAC,,, | Performed by: INTERNAL MEDICINE

## 2022-10-24 PROCEDURE — 3079F DIAST BP 80-89 MM HG: CPT | Mod: CPTII,S$GLB,, | Performed by: INTERNAL MEDICINE

## 2022-10-24 PROCEDURE — 99999 PR PBB SHADOW E&M-EST. PATIENT-LVL IV: CPT | Mod: PBBFAC,,, | Performed by: INTERNAL MEDICINE

## 2022-10-24 PROCEDURE — 1101F PR PT FALLS ASSESS DOC 0-1 FALLS W/OUT INJ PAST YR: ICD-10-PCS | Mod: CPTII,S$GLB,, | Performed by: INTERNAL MEDICINE

## 2022-10-24 PROCEDURE — 1160F PR REVIEW ALL MEDS BY PRESCRIBER/CLIN PHARMACIST DOCUMENTED: ICD-10-PCS | Mod: CPTII,S$GLB,, | Performed by: INTERNAL MEDICINE

## 2022-10-24 PROCEDURE — 3074F SYST BP LT 130 MM HG: CPT | Mod: CPTII,S$GLB,, | Performed by: INTERNAL MEDICINE

## 2022-10-24 PROCEDURE — 71046 X-RAY EXAM CHEST 2 VIEWS: CPT | Mod: 26,,, | Performed by: RADIOLOGY

## 2022-10-24 PROCEDURE — 4010F PR ACE/ARB THEARPY RXD/TAKEN: ICD-10-PCS | Mod: CPTII,S$GLB,, | Performed by: INTERNAL MEDICINE

## 2022-10-24 PROCEDURE — 3074F PR MOST RECENT SYSTOLIC BLOOD PRESSURE < 130 MM HG: ICD-10-PCS | Mod: CPTII,S$GLB,, | Performed by: INTERNAL MEDICINE

## 2022-10-24 PROCEDURE — 3079F PR MOST RECENT DIASTOLIC BLOOD PRESSURE 80-89 MM HG: ICD-10-PCS | Mod: CPTII,S$GLB,, | Performed by: INTERNAL MEDICINE

## 2022-10-24 PROCEDURE — 4010F ACE/ARB THERAPY RXD/TAKEN: CPT | Mod: CPTII,S$GLB,, | Performed by: INTERNAL MEDICINE

## 2022-10-24 PROCEDURE — 3288F PR FALLS RISK ASSESSMENT DOCUMENTED: ICD-10-PCS | Mod: CPTII,S$GLB,, | Performed by: INTERNAL MEDICINE

## 2022-10-24 PROCEDURE — 1159F MED LIST DOCD IN RCRD: CPT | Mod: CPTII,S$GLB,, | Performed by: INTERNAL MEDICINE

## 2022-10-24 PROCEDURE — 1159F PR MEDICATION LIST DOCUMENTED IN MEDICAL RECORD: ICD-10-PCS | Mod: CPTII,S$GLB,, | Performed by: INTERNAL MEDICINE

## 2022-10-24 PROCEDURE — 99204 PR OFFICE/OUTPT VISIT, NEW, LEVL IV, 45-59 MIN: ICD-10-PCS | Mod: S$GLB,,, | Performed by: INTERNAL MEDICINE

## 2022-10-24 PROCEDURE — 3288F FALL RISK ASSESSMENT DOCD: CPT | Mod: CPTII,S$GLB,, | Performed by: INTERNAL MEDICINE

## 2022-10-24 PROCEDURE — 71046 XR CHEST PA AND LATERAL: ICD-10-PCS | Mod: 26,,, | Performed by: RADIOLOGY

## 2022-10-24 RX ORDER — SODIUM, POTASSIUM,MAG SULFATES 17.5-3.13G
1 SOLUTION, RECONSTITUTED, ORAL ORAL
COMMUNITY
Start: 2022-10-19 | End: 2022-11-30 | Stop reason: ALTCHOICE
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
## 2022-11-16 LAB — CRC RECOMMENDATION EXT: NORMAL

## 2022-11-21 ENCOUNTER — CLINICAL SUPPORT (OUTPATIENT)
Dept: PULMONOLOGY | Facility: CLINIC | Age: 67
End: 2022-11-21
Payer: MEDICARE

## 2022-11-21 ENCOUNTER — DOCUMENTATION ONLY (OUTPATIENT)
Dept: PRIMARY CARE CLINIC | Facility: CLINIC | Age: 67
End: 2022-11-21
Payer: MEDICARE

## 2022-11-21 VITALS — BODY MASS INDEX: 33.49 KG/M2 | WEIGHT: 213.38 LBS | HEIGHT: 67 IN

## 2022-11-21 DIAGNOSIS — R06.02 SOBOE (SHORTNESS OF BREATH ON EXERTION): ICD-10-CM

## 2022-11-21 DIAGNOSIS — R05.3 CHRONIC COUGH: ICD-10-CM

## 2022-11-21 PROCEDURE — 94618 PULMONARY STRESS TESTING: ICD-10-PCS | Mod: S$GLB,,, | Performed by: INTERNAL MEDICINE

## 2022-11-21 PROCEDURE — 94060 PR EVAL OF BRONCHOSPASM: ICD-10-PCS | Mod: S$GLB,,, | Performed by: INTERNAL MEDICINE

## 2022-11-21 PROCEDURE — 94618 PULMONARY STRESS TESTING: CPT | Mod: S$GLB,,, | Performed by: INTERNAL MEDICINE

## 2022-11-21 PROCEDURE — 99999 PR PBB SHADOW E&M-EST. PATIENT-LVL I: ICD-10-PCS | Mod: PBBFAC,,,

## 2022-11-21 PROCEDURE — 94060 EVALUATION OF WHEEZING: CPT | Mod: S$GLB,,, | Performed by: INTERNAL MEDICINE

## 2022-11-21 PROCEDURE — 99999 PR PBB SHADOW E&M-EST. PATIENT-LVL I: CPT | Mod: PBBFAC,,,

## 2022-11-21 NOTE — PROCEDURES
"[facility] - Pulmonary Function  Six Minute Walk     SUMMARY     Ordering Provider: Dr Ross   Interpreting Provider: Dr Ross  Performing nurse/tech/RT: AUREA RRT  Diagnosis: Shortness of Breath (on exertion)  Height: 5' 7" (170.2 cm)  Weight: 96.8 kg (213 lb 6.5 oz)  BMI (Calculated): 33.4   Patient Race:             Phase Oxygen Assessment Supplemental O2 Heart   Rate Blood Pressure Daniel Dyspnea Scale Rating   Resting 95 % Room Air 74 bpm 137/68 0   Exercise        Minute        1 96 % Room Air 89 bpm     2 95 % Room Air 93 bpm     3 96 % Room Air 94 bpm     4 96 % Room Air 95 bpm     5 96 % Room Air 93 bpm     6  96 % Room Air 91 bpm 143/75 1   Recovery        Minute        1 96 % Room Air 78 bpm     2 97 % Room Air 76 bpm     3 97 % Room Air 75 bpm     4 98 % Room Air 75 bpm 130/75 0     Six Minute Walk Summary  6MWT Status: completed without stopping  Patient Reported:  (very light dyspnea)     Interpretation:  Did the patient stop or pause?: No                                         Total Time Walked (Calculated): 360 seconds  Final Partial Lap Distance (feet): 100 feet  Total Distance Meters (Calculated): 457.2 meters  Predicted Distance Meters (Calculated): 462.67 meters  Percentage of Predicted (Calculated): 98.82  Peak VO2 (Calculated): 17.7  Mets: 5.06  Has The Patient Had a Previous Six Minute Walk Test?: No       Previous 6MWT Results  Has The Patient Had a Previous Six Minute Walk Test?: No    "

## 2022-11-22 LAB
BRPFT: NORMAL
FEF 25 75 CHG: 81 %
FEF 25 75 LLN: 1.01
FEF 25 75 POST REF: 87.6 %
FEF 25 75 PRE REF: 48.4 %
FEF 25 75 REF: 2.3
FET100 CHG: -25 %
FEV1 CHG: 18.9 %
FEV1 FVC CHG: 11.9 %
FEV1 FVC LLN: 63
FEV1 FVC POST REF: 97.3 %
FEV1 FVC PRE REF: 86.9 %
FEV1 FVC REF: 77
FEV1 LLN: 2.13
FEV1 POST REF: 90.9 %
FEV1 PRE REF: 76.5 %
FEV1 REF: 2.93
FVC CHG: 6.2 %
FVC LLN: 2.85
FVC POST REF: 93.2 %
FVC PRE REF: 87.7 %
FVC REF: 3.84
PEF CHG: 8 %
PEF LLN: 5.66
PEF POST REF: 106.1 %
PEF PRE REF: 98.2 %
PEF REF: 7.78
POST FEF 25 75: 2.01 L/S (ref 1.01–3.59)
POST FET 100: 10.46 SEC
POST FEV1 FVC: 74.53 % (ref 63.24–90.01)
POST FEV1: 2.67 L (ref 2.13–3.74)
POST FVC: 3.58 L (ref 2.85–4.82)
POST PEF: 8.25 L/S (ref 5.66–9.9)
PRE FEF 25 75: 1.11 L/S (ref 1.01–3.59)
PRE FET 100: 13.95 SEC
PRE FEV1 FVC: 66.62 % (ref 63.24–90.01)
PRE FEV1: 2.24 L (ref 2.13–3.74)
PRE FVC: 3.37 L (ref 2.85–4.82)
PRE PEF: 7.64 L/S (ref 5.66–9.9)

## 2022-11-26 DIAGNOSIS — J44.9 COPD, MILD: Primary | ICD-10-CM

## 2022-11-26 RX ORDER — TIOTROPIUM BROMIDE AND OLODATEROL 3.124; 2.736 UG/1; UG/1
2 SPRAY, METERED RESPIRATORY (INHALATION) DAILY
Qty: 1 G | Refills: 5 | Status: SHIPPED | OUTPATIENT
Start: 2022-11-26 | End: 2023-01-30

## 2022-11-26 RX ORDER — ALBUTEROL SULFATE 90 UG/1
2 AEROSOL, METERED RESPIRATORY (INHALATION) EVERY 6 HOURS PRN
Qty: 18 G | Refills: 5 | Status: SHIPPED | OUTPATIENT
Start: 2022-11-26 | End: 2023-11-26

## 2022-11-30 ENCOUNTER — OFFICE VISIT (OUTPATIENT)
Dept: PRIMARY CARE CLINIC | Facility: CLINIC | Age: 67
End: 2022-11-30
Payer: MEDICARE

## 2022-11-30 VITALS
HEART RATE: 76 BPM | SYSTOLIC BLOOD PRESSURE: 140 MMHG | TEMPERATURE: 98 F | DIASTOLIC BLOOD PRESSURE: 72 MMHG | RESPIRATION RATE: 16 BRPM | OXYGEN SATURATION: 94 % | WEIGHT: 217 LBS | BODY MASS INDEX: 33.99 KG/M2

## 2022-11-30 DIAGNOSIS — Z00.00 MEDICARE ANNUAL WELLNESS VISIT, SUBSEQUENT: Primary | ICD-10-CM

## 2022-11-30 DIAGNOSIS — I10 PRIMARY HYPERTENSION: ICD-10-CM

## 2022-11-30 DIAGNOSIS — I25.10 ARTERIOSCLEROSIS OF CORONARY ARTERY: ICD-10-CM

## 2022-11-30 DIAGNOSIS — G47.33 OBSTRUCTIVE SLEEP APNEA SYNDROME: ICD-10-CM

## 2022-11-30 DIAGNOSIS — M67.432 GANGLION CYST OF WRIST, LEFT: ICD-10-CM

## 2022-11-30 DIAGNOSIS — R73.03 PREDIABETES: ICD-10-CM

## 2022-11-30 LAB — HBA1C MFR BLD: 5.6 % (ref 4–6)

## 2022-11-30 PROCEDURE — 3008F PR BODY MASS INDEX (BMI) DOCUMENTED: ICD-10-PCS | Mod: CPTII,,, | Performed by: INTERNAL MEDICINE

## 2022-11-30 PROCEDURE — 3078F PR MOST RECENT DIASTOLIC BLOOD PRESSURE < 80 MM HG: ICD-10-PCS | Mod: CPTII,,, | Performed by: INTERNAL MEDICINE

## 2022-11-30 PROCEDURE — 3078F DIAST BP <80 MM HG: CPT | Mod: CPTII,,, | Performed by: INTERNAL MEDICINE

## 2022-11-30 PROCEDURE — 3077F PR MOST RECENT SYSTOLIC BLOOD PRESSURE >= 140 MM HG: ICD-10-PCS | Mod: CPTII,,, | Performed by: INTERNAL MEDICINE

## 2022-11-30 PROCEDURE — 3008F BODY MASS INDEX DOCD: CPT | Mod: CPTII,,, | Performed by: INTERNAL MEDICINE

## 2022-11-30 PROCEDURE — 1159F PR MEDICATION LIST DOCUMENTED IN MEDICAL RECORD: ICD-10-PCS | Mod: CPTII,,, | Performed by: INTERNAL MEDICINE

## 2022-11-30 PROCEDURE — 4010F ACE/ARB THERAPY RXD/TAKEN: CPT | Mod: CPTII,,, | Performed by: INTERNAL MEDICINE

## 2022-11-30 PROCEDURE — 3288F PR FALLS RISK ASSESSMENT DOCUMENTED: ICD-10-PCS | Mod: CPTII,,, | Performed by: INTERNAL MEDICINE

## 2022-11-30 PROCEDURE — 1160F RVW MEDS BY RX/DR IN RCRD: CPT | Mod: CPTII,,, | Performed by: INTERNAL MEDICINE

## 2022-11-30 PROCEDURE — 1160F PR REVIEW ALL MEDS BY PRESCRIBER/CLIN PHARMACIST DOCUMENTED: ICD-10-PCS | Mod: CPTII,,, | Performed by: INTERNAL MEDICINE

## 2022-11-30 PROCEDURE — 1101F PR PT FALLS ASSESS DOC 0-1 FALLS W/OUT INJ PAST YR: ICD-10-PCS | Mod: CPTII,,, | Performed by: INTERNAL MEDICINE

## 2022-11-30 PROCEDURE — G0439 PPPS, SUBSEQ VISIT: HCPCS | Mod: ,,, | Performed by: INTERNAL MEDICINE

## 2022-11-30 PROCEDURE — 1126F PR PAIN SEVERITY QUANTIFIED, NO PAIN PRESENT: ICD-10-PCS | Mod: CPTII,,, | Performed by: INTERNAL MEDICINE

## 2022-11-30 PROCEDURE — 1126F AMNT PAIN NOTED NONE PRSNT: CPT | Mod: CPTII,,, | Performed by: INTERNAL MEDICINE

## 2022-11-30 PROCEDURE — 4010F PR ACE/ARB THEARPY RXD/TAKEN: ICD-10-PCS | Mod: CPTII,,, | Performed by: INTERNAL MEDICINE

## 2022-11-30 PROCEDURE — G0439 PR MEDICARE ANNUAL WELLNESS SUBSEQUENT VISIT: ICD-10-PCS | Mod: ,,, | Performed by: INTERNAL MEDICINE

## 2022-11-30 PROCEDURE — 3077F SYST BP >= 140 MM HG: CPT | Mod: CPTII,,, | Performed by: INTERNAL MEDICINE

## 2022-11-30 PROCEDURE — 1101F PT FALLS ASSESS-DOCD LE1/YR: CPT | Mod: CPTII,,, | Performed by: INTERNAL MEDICINE

## 2022-11-30 PROCEDURE — 3288F FALL RISK ASSESSMENT DOCD: CPT | Mod: CPTII,,, | Performed by: INTERNAL MEDICINE

## 2022-11-30 PROCEDURE — 1159F MED LIST DOCD IN RCRD: CPT | Mod: CPTII,,, | Performed by: INTERNAL MEDICINE

## 2022-11-30 NOTE — PROGRESS NOTES
Patient ID: [id number]     Chief Complaint: Medicare AWV      HPI:     Eugene Gayle Jr. is a 67 y.o. male here today for a Medicare Wellness.  [facility] Now on two inhalers. He saw Dr Hobbs for colon.       Opioid Screening: Patient medication list reviewed, patient is not taking prescription opioids. Patient is not using additional opioids than prescribed. Patient is at low risk of substance abuse based on this opioid use history.       ----------------------------  CAD (coronary artery disease)  GERD (gastroesophageal reflux disease)  Hyperlipidemia  Hypertension  Obesity, unspecified  ASHA on CPAP  Partial thickness rotator cuff tear  Personal history of colonic polyps      Comment:  Mandi Hobbs MD  [facility]  Pneumonia, unspecified organism  Postprandial diarrhea  Prediabetes  Skin cancer  Tubular adenoma of colon     Past Surgical History:   Procedure Laterality Date    APPENDECTOMY      BRONCHOSCOPY      CARDIAC CATHETERIZATION      COLONOSCOPY  10/24/2019    Mandi Hobbs MD  [facility]    COLONOSCOPY W/ BIOPSIES  11/16/2022    FINGER SURGERY      ROTATOR CUFF REPAIR  05/24/2018    SINUS SURGERY         Review of patient's allergies indicates:   Allergen Reactions    Ciprofloxacin Itching and Rash       Outpatient Medications Marked as Taking for the 11/30/22 encounter (Office Visit) with Dayan Osullivan MD   Medication Sig Dispense Refill    albuterol (PROVENTIL/VENTOLIN HFA) 90 mcg/actuation inhaler Inhale 2 puffs into the lungs every 6 (six) hours as needed for Wheezing. Rescue 18 g 5    aspirin (ECOTRIN) 81 MG EC tablet Take 81 mg by mouth once daily.      esomeprazole (NEXIUM) 20 MG capsule Take 20 mg by mouth before breakfast.      fluticasone (FLONASE) 50 mcg/actuation nasal spray 1 spray (50 mcg total) by Each Nare route once daily. 16 g 5    glucosamine-chondroitin 500-400 mg tablet Take 1 tablet by mouth 3  (three) times daily.      lisinopriL (PRINIVIL,ZESTRIL) 20 MG tablet Take 20 mg by mouth once daily.      lovastatin (MEVACOR) 40 MG tablet Take 1 tablet (40 mg total) by mouth nightly. 90 tablet 3    magnesium oxide (MAG-OX) 400 mg (241.3 mg magnesium) tablet Take 400 mg by mouth once daily.      metroNIDAZOLE (NORITATE) 1 % cream Apply topically once daily.      montelukast (SINGULAIR) 10 mg tablet TAKE 1 TABLET EVERY DAY 90 tablet 3    tamsulosin (FLOMAX) 0.4 mg Cp24       tiotropium-olodateroL (STIOLTO RESPIMAT) 2.5-2.5 mcg/actuation Mist Inhale 2 puffs into the lungs once daily. Controller 1 g 5    vitamin E 200 UNIT capsule Take 200 Units by mouth once daily.      [DISCONTINUED] silver sulfADIAZINE 1% (SILVADENE) 1 % cream          Social History     Socioeconomic History    Marital status: Single   Tobacco Use    Smoking status: Former    Smokeless tobacco: Never   Substance and Sexual Activity    Alcohol use: Yes     Alcohol/week: 2.0 standard drinks     Types: 2 Cans of beer per week    Drug use: No    Sexual activity: Yes   Working at [employer] now on [address]. It's part time but keeps him walking more.      Family History   Problem Relation Age of Onset    COPD Mother     Hypertension Mother     Heart disease Mother     Kidney failure Mother     Hypertension Father     Lung cancer Father     Prostate cancer Father     COPD Brother         Patient Care Team:  Dayan Osullivan MD as PCP - General (Internal Medicine)  Derrick Costa MD as Consulting Physician (Cardiology)  Mandi Hobbs III, MD as Consulting Physician (Gastroenterology)  Franklin Alvarez III, MD as Consulting Physician (Urology)  Ronny Ross MD as Consulting Physician (Pulmonary Disease)       Subjective:     Review of Systems   Constitutional:         Walking regularly, sleeping well.   HENT:          Using CPAP and feels rested still, pulmonary wants to look at it. No sinus visits for shots since he started wearing masks. He's still  using regularly   Eyes: Negative.         Goes Tuesday to do eye visit   Respiratory:  Positive for shortness of breath. Negative for wheezing.    Cardiovascular:         EKG good at cardiologist, he did for his colonoscopy   Gastrointestinal:         As per HPI   Genitourinary:         Sees [facility] today, will do PSA there.    Musculoskeletal:         Swelling at left wrist, will come and go. Has been impairing his ROM on the wrist, he can't even play his guitar now.    Skin:  Positive for rash.        Uses topicals on his folliculitis. Was seeing Marni but he was working in [employer] so he saw one there.    Neurological: Negative.    Psychiatric/Behavioral: Negative.         Patient Reported Health Risk Assessment  What [age] your age?: 65-69  Are you male or female?: Male  During the past four weeks, how much have you been bothered by emotional problems such as feeling anxious, depressed, irritable, sad, or downhearted and blue?: Not at all  During the past five weeks, has your physical and/or emotional health limited your social activities with family, friends, neighbors, or groups?: Not at all  During the past four weeks, how much bodily pain have you generally had?: No pain  During the past four weeks, was someone available to help if you needed and wanted help?: No, not at all  During the past four weeks, what was the hardest physical activity you could do for at least two minutes?: Light  Can you get to places out of walking distance without help?  (For example, can you travel alone on buses or taxis, or drive your own car?): Yes  Can you go shopping for groceries or clothes without someone's help?: Yes  Can you prepare your own meals?: Yes  Can you do your own housework without help?: Yes  Because of any health problems, do you need the help of another person with your personal care needs such as eating, bathing, dressing, or getting around the house?: No  Can you handle your own money without help?:  Yes  During the past four weeks, how would you rate your health in general?: Very good  How have things been going for you during the past four weeks?: Pretty well  Are you having difficulties driving your car?: No  Do you always fasten your seat belt when you are in a car?: Yes, usually  How often in the past four weeks have you been bothered by falling or dizzy when standing up?: Never  How often in the past four weeks have you been bothered by sexual problems?: Never  How often in the past four weeks have you been bothered by trouble eating well?: Never  How often in the past four weeks have you been bothered by teeth or denture problems?: Never  How often in the past four weeks have you been bothered with problems using the telephone?: Never  How often in the past four weeks have you been bothered by tiredness or fatigue?: Never  Have you fallen two or more times in the past year?: No  Are you afraid of falling?: No  Are you a smoker?: No  During the past four weeks, how many drinks of wine, beer, or other alcoholic beverages did you have?: 2-5 drinks per weeks  Do you exercise for about 20 minutes three or more days a week?: Yes, most of the time  Have you been given any information to help you with hazards in your house that might hurt you?: Yes  Have you been given any information to help you with keeping track of your medications?: Yes  How often do you have trouble taking medicines the way you've been told to take them?: I always take them as prescribed  How confident are you that you can control and manage most of your health problems?: Very confident  What is your race? (Check all that apply.):     Objective:     BP (!) 140/72   Pulse 76   Temp 97.6 °F (36.4 °C)   Resp 16   Wt 98.4 kg (217 lb)   SpO2 (!) 94%   BMI 33.99 kg/m²     Physical Exam  Vitals reviewed.   Constitutional:       Appearance: He is obese.   HENT:      Head: Normocephalic.      Right Ear: Tympanic membrane, ear canal and  external ear normal.      Left Ear: Tympanic membrane, ear canal and external ear normal.      Mouth/Throat:      Mouth: Mucous membranes are moist.      Pharynx: No posterior oropharyngeal erythema.   Eyes:      Extraocular Movements: Extraocular movements intact.      Conjunctiva/sclera: Conjunctivae normal.      Pupils: Pupils are equal, round, and reactive to light.   Cardiovascular:      Rate and Rhythm: Tachycardia present.      Heart sounds: Normal heart sounds.   Pulmonary:      Effort: Pulmonary effort is normal.      Breath sounds: Normal breath sounds. No wheezing.   Abdominal:      General: Abdomen is flat.      Palpations: Abdomen is soft.      Tenderness: There is no abdominal tenderness. There is no guarding.   Musculoskeletal:         General: Normal range of motion.      Cervical back: Normal range of motion. No tenderness.      Comments: Ganglion on the left volar wrist   Skin:     General: Skin is warm and dry.   Neurological:      General: No focal deficit present.      Mental Status: He is alert.      Motor: No weakness.      Gait: Gait normal.   Psychiatric:         Mood and Affect: Mood normal.         Behavior: Behavior normal.         Thought Content: Thought content normal.         Judgment: Judgment normal.         No flowsheet data found.  Fall Risk Assessment - Outpatient 11/30/2022 10/24/2022 5/23/2022 4/21/2018   Mobility Status Ambulatory Ambulatory Ambulatory Ambulatory   Number of falls 0 0 0 1 with injury   Identified as fall risk 0 0 0 1           Depression Screening  Over the past two weeks, has the patient felt down, depressed, or hopeless?: No  Over the past two weeks, has the patient felt little interest or pleasure in doing things?: No  Functional Ability/Safety Screening  Was the patient's timed Up & Go test unsteady or longer than 30 seconds?: No  Does the patient need help with phone, transportation, shopping, preparing meals, housework, laundry, meds, or managing money?:  No  Does the patient's home have rugs in the hallway, lack grab bars in the bathroom, lack handrails on the stairs or have poor lighting?: No  Have you noticed any hearing difficulties?: No  Cognitive Function (Assessed through direct observation with due consideration of information obtained by way of patient reports and/or concerns raised by family, friends, caretakers, or others)    Does the patient repeat questions/statements in the same day?: No  Does the patient have trouble remembering the date, year, and time?: No  Does the patient have difficulty managing finances?: No  Does the patient have a decreased sense of direction?: No  Assessment/Plan:     1. Medicare annual wellness visit, subsequent    2. Primary hypertension    3. Arteriosclerosis of coronary artery    4. Prediabetes    5. Obstructive sleep apnea syndrome    6. Ganglion cyst of wrist, left         Medicare Annual Wellness and Personalized Prevention Plan:   Fall Risk + Home Safety + Hearing Impairment + Depression Screen + Opioid and Substance Abuse Screening + Cognitive Impairment Screen + Health Risk Assessment all reviewed.     Health Maintenance Topics with due status: Not Due       Topic Last Completion Date    Lipid Panel 12/15/2020    High Dose Statin 10/24/2022    Aspirin/Antiplatelet Therapy 10/24/2022    Colorectal Cancer Screening 11/16/2022      The patient's Health Maintenance was reviewed and the following appears to be due at this time:   Health Maintenance Due   Topic Date Due    Hepatitis C Screening  Never done    TETANUS VACCINE  Never done    Shingles Vaccine (2 of 3) 04/12/2014    Abdominal Aortic Aneurysm Screening  Never done    COVID-19 Vaccine (3 - Booster for Moderna series) 08/14/2021    Pneumococcal Vaccines (Age 65+) (3 - PPSV23 if available, else PCV20) 12/14/2021    Hemoglobin A1c (Prediabetes)  12/15/2021       Advance Care Planning   I attest to discussing Advance Care Planning with patient and/or family  member.  Education was provided including the importance of the Health Care Power of , Advance Directives, and/or [place] documentation.  The patient expressed understanding to the importance of this information and discussion.   Advance Care Planning     Date: 11/30/2022  Has living will, he brought it in last visit but I can't find it. He agrees to redo.            No follow-ups on file. In addition to their scheduled follow up, the patient has also been instructed to follow up on as needed basis.

## 2022-11-30 NOTE — LETTER
July 13, 2023    Eugene Gayle Jr.  [address]  [place] [address]             [facility]  Primary Care  [address] [place] [id number]  Phone: [phone]   July 13, 2023     Patient: Eugene Gayle Jr.   YOB: 1955   Date of Visit: 11/30/2022       To Whom it May Concern:    Eugene Gayle was seen in my clinic on 7/13/23. He may return to work on 7/16/23 .    Please excuse him from any classes or work missed 7/9/23-7/15/23.    If you have any questions or concerns, please don't hesitate to call.    Sincerely,         Dayan Osullivan MD

## 2022-12-05 ENCOUNTER — DOCUMENTATION ONLY (OUTPATIENT)
Dept: ADMINISTRATIVE | Facility: HOSPITAL | Age: 67
End: 2022-12-05
Payer: MEDICARE

## 2022-12-13 ENCOUNTER — TELEPHONE (OUTPATIENT)
Dept: PRIMARY CARE CLINIC | Facility: CLINIC | Age: 67
End: 2022-12-13
Payer: MEDICARE

## 2022-12-13 NOTE — TELEPHONE ENCOUNTER
Patient came in to give the names of his dermatologists which I added to his chart in the care team section.

## 2023-01-30 ENCOUNTER — PATIENT MESSAGE (OUTPATIENT)
Dept: PULMONOLOGY | Facility: CLINIC | Age: 68
End: 2023-01-30

## 2023-01-30 ENCOUNTER — OFFICE VISIT (OUTPATIENT)
Dept: PULMONOLOGY | Facility: CLINIC | Age: 68
End: 2023-01-30
Payer: MEDICARE

## 2023-01-30 VITALS
OXYGEN SATURATION: 96 % | DIASTOLIC BLOOD PRESSURE: 84 MMHG | HEIGHT: 67 IN | BODY MASS INDEX: 33.32 KG/M2 | HEART RATE: 83 BPM | SYSTOLIC BLOOD PRESSURE: 140 MMHG | RESPIRATION RATE: 17 BRPM | WEIGHT: 212.31 LBS

## 2023-01-30 DIAGNOSIS — J44.89 COPD WITH ASTHMA: Primary | ICD-10-CM

## 2023-01-30 DIAGNOSIS — G47.33 OSA ON CPAP: ICD-10-CM

## 2023-01-30 DIAGNOSIS — J98.6 ELEVATED DIAPHRAGM: ICD-10-CM

## 2023-01-30 DIAGNOSIS — Z87.891 FORMER SMOKER: ICD-10-CM

## 2023-01-30 PROCEDURE — 99214 PR OFFICE/OUTPT VISIT, EST, LEVL IV, 30-39 MIN: ICD-10-PCS | Mod: S$GLB,,, | Performed by: INTERNAL MEDICINE

## 2023-01-30 PROCEDURE — 3079F PR MOST RECENT DIASTOLIC BLOOD PRESSURE 80-89 MM HG: ICD-10-PCS | Mod: CPTII,S$GLB,, | Performed by: INTERNAL MEDICINE

## 2023-01-30 PROCEDURE — 3288F FALL RISK ASSESSMENT DOCD: CPT | Mod: CPTII,S$GLB,, | Performed by: INTERNAL MEDICINE

## 2023-01-30 PROCEDURE — 3077F PR MOST RECENT SYSTOLIC BLOOD PRESSURE >= 140 MM HG: ICD-10-PCS | Mod: CPTII,S$GLB,, | Performed by: INTERNAL MEDICINE

## 2023-01-30 PROCEDURE — 99214 OFFICE O/P EST MOD 30 MIN: CPT | Mod: S$GLB,,, | Performed by: INTERNAL MEDICINE

## 2023-01-30 PROCEDURE — 4010F ACE/ARB THERAPY RXD/TAKEN: CPT | Mod: CPTII,S$GLB,, | Performed by: INTERNAL MEDICINE

## 2023-01-30 PROCEDURE — 99999 PR PBB SHADOW E&M-EST. PATIENT-LVL IV: ICD-10-PCS | Mod: PBBFAC,,, | Performed by: INTERNAL MEDICINE

## 2023-01-30 PROCEDURE — 1157F ADVNC CARE PLAN IN RCRD: CPT | Mod: CPTII,S$GLB,, | Performed by: INTERNAL MEDICINE

## 2023-01-30 PROCEDURE — 1159F PR MEDICATION LIST DOCUMENTED IN MEDICAL RECORD: ICD-10-PCS | Mod: CPTII,S$GLB,, | Performed by: INTERNAL MEDICINE

## 2023-01-30 PROCEDURE — 3077F SYST BP >= 140 MM HG: CPT | Mod: CPTII,S$GLB,, | Performed by: INTERNAL MEDICINE

## 2023-01-30 PROCEDURE — 1101F PR PT FALLS ASSESS DOC 0-1 FALLS W/OUT INJ PAST YR: ICD-10-PCS | Mod: CPTII,S$GLB,, | Performed by: INTERNAL MEDICINE

## 2023-01-30 PROCEDURE — 4010F PR ACE/ARB THEARPY RXD/TAKEN: ICD-10-PCS | Mod: CPTII,S$GLB,, | Performed by: INTERNAL MEDICINE

## 2023-01-30 PROCEDURE — 1159F MED LIST DOCD IN RCRD: CPT | Mod: CPTII,S$GLB,, | Performed by: INTERNAL MEDICINE

## 2023-01-30 PROCEDURE — 3079F DIAST BP 80-89 MM HG: CPT | Mod: CPTII,S$GLB,, | Performed by: INTERNAL MEDICINE

## 2023-01-30 PROCEDURE — 1160F PR REVIEW ALL MEDS BY PRESCRIBER/CLIN PHARMACIST DOCUMENTED: ICD-10-PCS | Mod: CPTII,S$GLB,, | Performed by: INTERNAL MEDICINE

## 2023-01-30 PROCEDURE — 3008F PR BODY MASS INDEX (BMI) DOCUMENTED: ICD-10-PCS | Mod: CPTII,S$GLB,, | Performed by: INTERNAL MEDICINE

## 2023-01-30 PROCEDURE — 1160F RVW MEDS BY RX/DR IN RCRD: CPT | Mod: CPTII,S$GLB,, | Performed by: INTERNAL MEDICINE

## 2023-01-30 PROCEDURE — 1157F PR ADVANCE CARE PLAN OR EQUIV PRESENT IN MEDICAL RECORD: ICD-10-PCS | Mod: CPTII,S$GLB,, | Performed by: INTERNAL MEDICINE

## 2023-01-30 PROCEDURE — 99999 PR PBB SHADOW E&M-EST. PATIENT-LVL IV: CPT | Mod: PBBFAC,,, | Performed by: INTERNAL MEDICINE

## 2023-01-30 PROCEDURE — 3008F BODY MASS INDEX DOCD: CPT | Mod: CPTII,S$GLB,, | Performed by: INTERNAL MEDICINE

## 2023-01-30 PROCEDURE — 3288F PR FALLS RISK ASSESSMENT DOCUMENTED: ICD-10-PCS | Mod: CPTII,S$GLB,, | Performed by: INTERNAL MEDICINE

## 2023-01-30 PROCEDURE — 1101F PT FALLS ASSESS-DOCD LE1/YR: CPT | Mod: CPTII,S$GLB,, | Performed by: INTERNAL MEDICINE

## 2023-01-30 RX ORDER — FLUTICASONE FUROATE AND VILANTEROL 100; 25 UG/1; UG/1
1 POWDER RESPIRATORY (INHALATION) DAILY
Qty: 60 EACH | Refills: 0 | Status: SHIPPED | OUTPATIENT
Start: 2023-01-30 | End: 2023-07-31
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
## 2023-02-15 ENCOUNTER — TELEPHONE (OUTPATIENT)
Dept: PRIMARY CARE CLINIC | Facility: CLINIC | Age: 68
End: 2023-02-15
Payer: MEDICARE

## 2023-02-15 DIAGNOSIS — M54.9 UPPER BACK PAIN: ICD-10-CM

## 2023-02-15 DIAGNOSIS — R05.9 COUGH, UNSPECIFIED TYPE: Primary | ICD-10-CM

## 2023-02-16 ENCOUNTER — HOSPITAL ENCOUNTER (OUTPATIENT)
Dept: RADIOLOGY | Facility: HOSPITAL | Age: 68
Discharge: HOME | End: 2023-02-16
Attending: INTERNAL MEDICINE
Payer: MEDICARE

## 2023-02-16 DIAGNOSIS — M54.9 UPPER BACK PAIN: ICD-10-CM

## 2023-02-16 DIAGNOSIS — R05.9 COUGH, UNSPECIFIED TYPE: ICD-10-CM

## 2023-02-16 PROCEDURE — 71046 X-RAY EXAM CHEST 2 VIEWS: CPT | Mod: TC

## 2023-02-16 NOTE — TELEPHONE ENCOUNTER
----- Message from Antonieta Moeller sent at 2/15/2023  4:57 PM CST -----  Regarding: Xray  Pt called and stated he was having back pain so he went to a [facility] and they said he had a sinus infection and they checked his lungs and they are clear. He wants to go have an Xray to check them at [facility]  because he is still in pain and is concerned.

## 2023-02-20 ENCOUNTER — TELEPHONE (OUTPATIENT)
Dept: PRIMARY CARE CLINIC | Facility: CLINIC | Age: 68
End: 2023-02-20
Payer: MEDICARE

## 2023-05-04 ENCOUNTER — TELEPHONE (OUTPATIENT)
Dept: PRIMARY CARE CLINIC | Facility: CLINIC | Age: 68
End: 2023-05-04
Payer: MEDICARE

## 2023-05-04 DIAGNOSIS — R53.83 OTHER FATIGUE: ICD-10-CM

## 2023-05-04 DIAGNOSIS — I10 ESSENTIAL HYPERTENSION: ICD-10-CM

## 2023-05-04 DIAGNOSIS — I25.10 ATHEROSCLEROSIS OF NATIVE CORONARY ARTERY OF NATIVE HEART WITHOUT ANGINA PECTORIS: Primary | ICD-10-CM

## 2023-05-04 DIAGNOSIS — Z11.59 ENCOUNTER FOR HEPATITIS C SCREENING TEST FOR LOW RISK PATIENT: ICD-10-CM

## 2023-05-04 DIAGNOSIS — R73.03 PREDIABETES: ICD-10-CM

## 2023-05-04 NOTE — TELEPHONE ENCOUNTER
----- Message from Meaghan Guevara sent at 5/4/2023 10:53 AM CDT -----  Regarding: labs  .Caller is requesting to schedule their Lab appointment prior to annual appointment.  Order is not listed in EPIC.  Please enter order and contact patient to schedule.    Name of Caller:pt    Preferred Date and Time of Labs: 5/4/23    Date of EPP Appointment:7/31/23    Where would they like the lab performed?[facility]    Would the patient rather a call back or a response via [facility]? Call back    Best Call Back Number:[phone]    Additional Information:pt is requesting labs

## 2023-05-04 NOTE — TELEPHONE ENCOUNTER
Spoke with patient and let him know his lab orders were faxed to [facility], patient verbalized understanding.

## 2023-05-06 LAB
ALBUMIN SERPL-MCNC: 4.5 G/DL (ref 3.8–4.8)
ALBUMIN/GLOB SERPL: 2.4 {RATIO} (ref 1.2–2.2)
ALP SERPL-CCNC: 67 IU/L (ref 44–121)
ALT SERPL-CCNC: 15 IU/L (ref 0–44)
APPEARANCE UR: CLEAR
AST SERPL-CCNC: 14 IU/L (ref 0–40)
BACTERIA #/AREA URNS HPF: NORMAL /[HPF]
BASOPHILS # BLD AUTO: 0 X10E3/UL (ref 0–0.2)
BASOPHILS NFR BLD AUTO: 1 %
BILIRUB SERPL-MCNC: 0.5 MG/DL (ref 0–1.2)
BILIRUB UR QL STRIP: NEGATIVE
BUN SERPL-MCNC: 16 MG/DL (ref 8–27)
BUN/CREAT SERPL: 19 (ref 10–24)
CALCIUM SERPL-MCNC: 9 MG/DL (ref 8.6–10.2)
CHLORIDE SERPL-SCNC: 102 MMOL/L (ref 96–106)
CHOLEST SERPL-MCNC: 154 MG/DL (ref 100–199)
CO2 SERPL-SCNC: 22 MMOL/L (ref 20–29)
COLOR UR: YELLOW
CREAT SERPL-MCNC: 0.84 MG/DL (ref 0.76–1.27)
CRYSTALS URNS MICRO: NORMAL
EOSINOPHIL # BLD AUTO: 0.1 X10E3/UL (ref 0–0.4)
EOSINOPHIL NFR BLD AUTO: 2 %
EPI CELLS #/AREA URNS HPF: NORMAL /HPF (ref 0–10)
ERYTHROCYTE [DISTWIDTH] IN BLOOD BY AUTOMATED COUNT: 12.1 % (ref 11.6–15.4)
EST. GFR  (NO RACE VARIABLE): 94 ML/MIN/1.73
GLOBULIN SER CALC-MCNC: 1.9 G/DL (ref 1.5–4.5)
GLUCOSE SERPL-MCNC: 86 MG/DL (ref 70–99)
GLUCOSE UR QL STRIP: NEGATIVE
HBA1C MFR BLD: 5.1 % (ref 4.8–5.6)
HCT VFR BLD AUTO: 43.3 % (ref 37.5–51)
HCV IGG SERPL QL IA: NON REACTIVE
HDLC SERPL-MCNC: 78 MG/DL
HGB BLD-MCNC: 14.4 G/DL (ref 13–17.7)
HGB UR QL STRIP: NEGATIVE
IMM GRANULOCYTES NFR BLD AUTO: 0 %
KETONES UR QL STRIP: NEGATIVE
LDLC SERPL CALC-MCNC: 66 MG/DL (ref 0–99)
LEUKOCYTE ESTERASE UR QL STRIP: NEGATIVE
LYMPHOCYTES # BLD AUTO: 1.4 X10E3/UL (ref 0.7–3.1)
LYMPHOCYTES NFR BLD AUTO: 27 %
MCH RBC QN AUTO: 28.9 PG (ref 26.6–33)
MCHC RBC AUTO-ENTMCNC: 33.3 G/DL (ref 31.5–35.7)
MCV RBC AUTO: 87 FL (ref 79–97)
MICRO URNS: NORMAL
MICRO URNS: NORMAL
MONOCYTES # BLD AUTO: 0.5 X10E3/UL (ref 0.1–0.9)
MONOCYTES NFR BLD AUTO: 10 %
NEUTROPHILS # BLD AUTO: 3.3 X10E3/UL (ref 1.4–7)
NEUTROPHILS NFR BLD AUTO: 60 %
NITRITE UR QL STRIP: NEGATIVE
PH UR STRIP: 6 [PH] (ref 5–7.5)
PLATELET # BLD AUTO: 186 X10E3/UL (ref 150–450)
POTASSIUM SERPL-SCNC: 4.6 MMOL/L (ref 3.5–5.2)
PROT SERPL-MCNC: 6.4 G/DL (ref 6–8.5)
PROT UR QL STRIP: NEGATIVE
RBC # BLD AUTO: 4.98 X10E6/UL (ref 4.14–5.8)
RBC #/AREA URNS HPF: NORMAL /HPF (ref 0–2)
SODIUM SERPL-SCNC: 137 MMOL/L (ref 134–144)
SP GR UR STRIP: 1.02 (ref 1–1.03)
TRIGL SERPL-MCNC: 48 MG/DL (ref 0–149)
TSH SERPL DL<=0.005 MIU/L-ACNC: 0.89 UIU/ML (ref 0.45–4.5)
URINALYSIS REFLEX: NORMAL
UROBILINOGEN UR STRIP-MCNC: 0.2 MG/DL (ref 0.2–1)
VLDLC SERPL CALC-MCNC: 10 MG/DL (ref 5–40)
WBC # BLD AUTO: 5.4 X10E3/UL (ref 3.4–10.8)
WBC #/AREA URNS HPF: NORMAL /HPF (ref 0–5)

## 2023-05-10 ENCOUNTER — TELEPHONE (OUTPATIENT)
Dept: PRIMARY CARE CLINIC | Facility: CLINIC | Age: 68
End: 2023-05-10
Payer: MEDICARE

## 2023-05-10 NOTE — TELEPHONE ENCOUNTER
Results were given to patient and the patient verbalized understanding     Spoke with the patient and he stated he will take his blood pressure at home for a week and bring us the readings. His wellness is scheduled for 12/4 at 8:20 am.

## 2023-05-10 NOTE — TELEPHONE ENCOUNTER
----- Message from Dayan Osullivan MD sent at 5/9/2023  5:14 PM CDT -----  His lab looks good,I had a hard copy from [employer] to send to Dr Costa on my desk. His BP was 140 the last few times, that's borderline high, he needs to check at home and send us a report of come get it checked here. Needs Wellness 12/4

## 2023-06-26 ENCOUNTER — PATIENT MESSAGE (OUTPATIENT)
Dept: ADMINISTRATIVE | Facility: OTHER | Age: 68
End: 2023-06-26
Payer: MEDICARE

## 2023-06-28 ENCOUNTER — PATIENT MESSAGE (OUTPATIENT)
Dept: ADMINISTRATIVE | Facility: OTHER | Age: 68
End: 2023-06-28
Payer: MEDICARE

## 2023-06-28 DIAGNOSIS — Z87.891 HISTORY OF TOBACCO USE: Primary | ICD-10-CM

## 2023-06-28 DIAGNOSIS — I10 HTN (HYPERTENSION): ICD-10-CM

## 2023-06-29 ENCOUNTER — TELEPHONE (OUTPATIENT)
Dept: PRIMARY CARE CLINIC | Facility: CLINIC | Age: 68
End: 2023-06-29
Payer: MEDICARE

## 2023-07-03 ENCOUNTER — HOSPITAL ENCOUNTER (OUTPATIENT)
Dept: RADIOLOGY | Facility: HOSPITAL | Age: 68
Discharge: HOME | End: 2023-07-03
Attending: INTERNAL MEDICINE
Payer: MEDICARE

## 2023-07-03 DIAGNOSIS — Z87.891 HISTORY OF TOBACCO USE: ICD-10-CM

## 2023-07-03 DIAGNOSIS — I10 HTN (HYPERTENSION): ICD-10-CM

## 2023-07-03 PROCEDURE — 76706 US ABDL AORTA SCREEN AAA: CPT | Mod: TC

## 2023-07-07 ENCOUNTER — PATIENT MESSAGE (OUTPATIENT)
Dept: INFECTIOUS DISEASES | Facility: CLINIC | Age: 68
End: 2023-07-07
Payer: MEDICARE

## 2023-07-11 ENCOUNTER — TELEPHONE (OUTPATIENT)
Dept: PRIMARY CARE CLINIC | Facility: CLINIC | Age: 68
End: 2023-07-11
Payer: MEDICARE

## 2023-07-11 DIAGNOSIS — S93.401A SPRAIN OF RIGHT ANKLE, UNSPECIFIED LIGAMENT, INITIAL ENCOUNTER: Primary | ICD-10-CM

## 2023-07-11 NOTE — TELEPHONE ENCOUNTER
----- Message from Quinn Mcduffie sent at 7/11/2023  1:54 PM CDT -----  Regarding: call back  .Type:  Needs Medical Advice    Who Called: mertile  Symptoms (please be specific): sunday   Would the patient rather a call back or a response via [facility]?   Best Call Back Number: [phone]  Additional Information: pt states he twisted his ankle Sunday and wants to get a order for a x ray on his right ankle pls call back

## 2023-07-12 ENCOUNTER — TELEPHONE (OUTPATIENT)
Dept: PRIMARY CARE CLINIC | Facility: CLINIC | Age: 68
End: 2023-07-12
Payer: MEDICARE

## 2023-07-12 ENCOUNTER — HOSPITAL ENCOUNTER (OUTPATIENT)
Dept: RADIOLOGY | Facility: HOSPITAL | Age: 68
Discharge: HOME | End: 2023-07-12
Attending: INTERNAL MEDICINE
Payer: MEDICARE

## 2023-07-12 DIAGNOSIS — S93.401A SPRAIN OF RIGHT ANKLE, UNSPECIFIED LIGAMENT, INITIAL ENCOUNTER: ICD-10-CM

## 2023-07-12 PROCEDURE — 73610 X-RAY EXAM OF ANKLE: CPT | Mod: TC,RT

## 2023-07-12 NOTE — TELEPHONE ENCOUNTER
----- Message from Quinn Mcduffie sent at 7/12/2023 10:11 AM CDT -----  Regarding: call back  .Type:  Needs Medical Advice    Who Called: joselo   Would the patient rather a call back or a response via [facility]? marko  Best Call Back Number: [phone]   Additional Information: pt states her needs a dr santa 07/09 - 07/15 and also is requesting a call back with his x-ray results and also wants to know if the office received his ultrasound from  his cardiologist .

## 2023-07-12 NOTE — TELEPHONE ENCOUNTER
----- Message from Dayan Osullivan MD sent at 7/12/2023 12:37 PM CDT -----  No chips or visible fluid

## 2023-07-23 ENCOUNTER — PATIENT MESSAGE (OUTPATIENT)
Dept: PRIMARY CARE CLINIC | Facility: CLINIC | Age: 68
End: 2023-07-23
Payer: MEDICARE

## 2023-07-24 VITALS — SYSTOLIC BLOOD PRESSURE: 133 MMHG | DIASTOLIC BLOOD PRESSURE: 72 MMHG

## 2023-07-26 ENCOUNTER — PATIENT MESSAGE (OUTPATIENT)
Dept: PULMONOLOGY | Facility: CLINIC | Age: 68
End: 2023-07-26
Payer: MEDICARE

## 2023-07-31 ENCOUNTER — OFFICE VISIT (OUTPATIENT)
Dept: PULMONOLOGY | Facility: CLINIC | Age: 68
End: 2023-07-31
Payer: MEDICARE

## 2023-07-31 VITALS
WEIGHT: 203.63 LBS | DIASTOLIC BLOOD PRESSURE: 78 MMHG | RESPIRATION RATE: 17 BRPM | SYSTOLIC BLOOD PRESSURE: 116 MMHG | HEIGHT: 67 IN | BODY MASS INDEX: 31.96 KG/M2 | HEART RATE: 82 BPM | OXYGEN SATURATION: 97 %

## 2023-07-31 DIAGNOSIS — Z87.891 FORMER SMOKER: ICD-10-CM

## 2023-07-31 DIAGNOSIS — G47.33 OSA ON CPAP: Primary | ICD-10-CM

## 2023-07-31 DIAGNOSIS — J44.89 COPD WITH ASTHMA: ICD-10-CM

## 2023-07-31 PROCEDURE — 3078F DIAST BP <80 MM HG: CPT | Mod: CPTII,S$GLB,, | Performed by: INTERNAL MEDICINE

## 2023-07-31 PROCEDURE — 1101F PR PT FALLS ASSESS DOC 0-1 FALLS W/OUT INJ PAST YR: ICD-10-PCS | Mod: CPTII,S$GLB,, | Performed by: INTERNAL MEDICINE

## 2023-07-31 PROCEDURE — 99213 PR OFFICE/OUTPT VISIT, EST, LEVL III, 20-29 MIN: ICD-10-PCS | Mod: S$GLB,,, | Performed by: INTERNAL MEDICINE

## 2023-07-31 PROCEDURE — 3074F PR MOST RECENT SYSTOLIC BLOOD PRESSURE < 130 MM HG: ICD-10-PCS | Mod: CPTII,S$GLB,, | Performed by: INTERNAL MEDICINE

## 2023-07-31 PROCEDURE — 3288F PR FALLS RISK ASSESSMENT DOCUMENTED: ICD-10-PCS | Mod: CPTII,S$GLB,, | Performed by: INTERNAL MEDICINE

## 2023-07-31 PROCEDURE — 3044F PR MOST RECENT HEMOGLOBIN A1C LEVEL <7.0%: ICD-10-PCS | Mod: CPTII,S$GLB,, | Performed by: INTERNAL MEDICINE

## 2023-07-31 PROCEDURE — 1157F ADVNC CARE PLAN IN RCRD: CPT | Mod: CPTII,S$GLB,, | Performed by: INTERNAL MEDICINE

## 2023-07-31 PROCEDURE — 4010F PR ACE/ARB THEARPY RXD/TAKEN: ICD-10-PCS | Mod: CPTII,S$GLB,, | Performed by: INTERNAL MEDICINE

## 2023-07-31 PROCEDURE — 3008F PR BODY MASS INDEX (BMI) DOCUMENTED: ICD-10-PCS | Mod: CPTII,S$GLB,, | Performed by: INTERNAL MEDICINE

## 2023-07-31 PROCEDURE — 3288F FALL RISK ASSESSMENT DOCD: CPT | Mod: CPTII,S$GLB,, | Performed by: INTERNAL MEDICINE

## 2023-07-31 PROCEDURE — 4010F ACE/ARB THERAPY RXD/TAKEN: CPT | Mod: CPTII,S$GLB,, | Performed by: INTERNAL MEDICINE

## 2023-07-31 PROCEDURE — 3044F HG A1C LEVEL LT 7.0%: CPT | Mod: CPTII,S$GLB,, | Performed by: INTERNAL MEDICINE

## 2023-07-31 PROCEDURE — 99999 PR PBB SHADOW E&M-EST. PATIENT-LVL III: ICD-10-PCS | Mod: PBBFAC,,, | Performed by: INTERNAL MEDICINE

## 2023-07-31 PROCEDURE — 1160F RVW MEDS BY RX/DR IN RCRD: CPT | Mod: CPTII,S$GLB,, | Performed by: INTERNAL MEDICINE

## 2023-07-31 PROCEDURE — 1101F PT FALLS ASSESS-DOCD LE1/YR: CPT | Mod: CPTII,S$GLB,, | Performed by: INTERNAL MEDICINE

## 2023-07-31 PROCEDURE — 1159F MED LIST DOCD IN RCRD: CPT | Mod: CPTII,S$GLB,, | Performed by: INTERNAL MEDICINE

## 2023-07-31 PROCEDURE — 3008F BODY MASS INDEX DOCD: CPT | Mod: CPTII,S$GLB,, | Performed by: INTERNAL MEDICINE

## 2023-07-31 PROCEDURE — 99213 OFFICE O/P EST LOW 20 MIN: CPT | Mod: S$GLB,,, | Performed by: INTERNAL MEDICINE

## 2023-07-31 PROCEDURE — 3078F PR MOST RECENT DIASTOLIC BLOOD PRESSURE < 80 MM HG: ICD-10-PCS | Mod: CPTII,S$GLB,, | Performed by: INTERNAL MEDICINE

## 2023-07-31 PROCEDURE — 1159F PR MEDICATION LIST DOCUMENTED IN MEDICAL RECORD: ICD-10-PCS | Mod: CPTII,S$GLB,, | Performed by: INTERNAL MEDICINE

## 2023-07-31 PROCEDURE — 99999 PR PBB SHADOW E&M-EST. PATIENT-LVL III: CPT | Mod: PBBFAC,,, | Performed by: INTERNAL MEDICINE

## 2023-07-31 PROCEDURE — 1157F PR ADVANCE CARE PLAN OR EQUIV PRESENT IN MEDICAL RECORD: ICD-10-PCS | Mod: CPTII,S$GLB,, | Performed by: INTERNAL MEDICINE

## 2023-07-31 PROCEDURE — 1160F PR REVIEW ALL MEDS BY PRESCRIBER/CLIN PHARMACIST DOCUMENTED: ICD-10-PCS | Mod: CPTII,S$GLB,, | Performed by: INTERNAL MEDICINE

## 2023-07-31 PROCEDURE — 3074F SYST BP LT 130 MM HG: CPT | Mod: CPTII,S$GLB,, | Performed by: INTERNAL MEDICINE
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
## 2023-11-27 ENCOUNTER — TELEPHONE (OUTPATIENT)
Dept: PRIMARY CARE CLINIC | Facility: CLINIC | Age: 68
End: 2023-11-27
Payer: MEDICARE

## 2023-11-27 NOTE — TELEPHONE ENCOUNTER
PT CONFIRMED APPT     1. Are there any outstanding tasks in the patient's chart? (Ex. Labs, MMG)?-    2. Do we have any outstanding/Pending referrals?-    3. Has the patient been seen in an ER, Urgent Care or been admitted to the hospital since last office visit with our office?-    4. Has the patient seen any other health care provider (doctors) since last visit?-    5. Has the patient had any blood work or x-rays done since last visit?-    QUALITY-DO NOT ASK PATIENT    -PNEUMONIA  13:12/14/20  20:-  23:10/16/09    -COLON:11/16/22    -DEXA:-    -DIABETES SCREEN  A1C:5/5/23  MICRO UA:-  EYE EXAM:-  FOOT EXAM:-

## 2023-11-30 PROBLEM — J06.9 VIRAL UPPER RESPIRATORY TRACT INFECTION: Status: RESOLVED | Noted: 2018-04-21 | Resolved: 2023-11-30

## 2023-11-30 PROBLEM — N45.3 ORCHITIS AND EPIDIDYMITIS: Status: RESOLVED | Noted: 2022-05-23 | Resolved: 2023-11-30

## 2023-11-30 PROBLEM — H26.9 CATARACT OF BOTH EYES: Status: RESOLVED | Noted: 2022-05-17 | Resolved: 2023-11-30
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
## 2023-12-04 ENCOUNTER — OFFICE VISIT (OUTPATIENT)
Dept: PRIMARY CARE CLINIC | Facility: CLINIC | Age: 68
End: 2023-12-04
Payer: MEDICARE

## 2023-12-04 VITALS
BODY MASS INDEX: 33.43 KG/M2 | TEMPERATURE: 98 F | HEIGHT: 67 IN | HEART RATE: 78 BPM | SYSTOLIC BLOOD PRESSURE: 126 MMHG | WEIGHT: 213 LBS | RESPIRATION RATE: 16 BRPM | OXYGEN SATURATION: 95 % | DIASTOLIC BLOOD PRESSURE: 65 MMHG

## 2023-12-04 DIAGNOSIS — M76.821 TIBIALIS TENDINITIS OF RIGHT LOWER EXTREMITY: ICD-10-CM

## 2023-12-04 DIAGNOSIS — I10 PRIMARY HYPERTENSION: ICD-10-CM

## 2023-12-04 DIAGNOSIS — I25.10 ARTERIOSCLEROSIS OF CORONARY ARTERY: ICD-10-CM

## 2023-12-04 DIAGNOSIS — R73.03 PREDIABETES: ICD-10-CM

## 2023-12-04 DIAGNOSIS — G47.33 OBSTRUCTIVE SLEEP APNEA SYNDROME: ICD-10-CM

## 2023-12-04 DIAGNOSIS — Z00.00 MEDICARE ANNUAL WELLNESS VISIT, SUBSEQUENT: Primary | ICD-10-CM

## 2023-12-04 PROCEDURE — 90694 VACC AIIV4 NO PRSRV 0.5ML IM: CPT | Mod: ,,, | Performed by: INTERNAL MEDICINE

## 2023-12-04 PROCEDURE — 3074F SYST BP LT 130 MM HG: CPT | Mod: CPTII,,, | Performed by: INTERNAL MEDICINE

## 2023-12-04 PROCEDURE — 90694 FLU VACCINE - QUADRIVALENT - ADJUVANTED: ICD-10-PCS | Mod: ,,, | Performed by: INTERNAL MEDICINE

## 2023-12-04 PROCEDURE — G0009 ADMIN PNEUMOCOCCAL VACCINE: HCPCS | Mod: ,,, | Performed by: INTERNAL MEDICINE

## 2023-12-04 PROCEDURE — 1160F PR REVIEW ALL MEDS BY PRESCRIBER/CLIN PHARMACIST DOCUMENTED: ICD-10-PCS | Mod: CPTII,,, | Performed by: INTERNAL MEDICINE

## 2023-12-04 PROCEDURE — 1101F PR PT FALLS ASSESS DOC 0-1 FALLS W/OUT INJ PAST YR: ICD-10-PCS | Mod: CPTII,,, | Performed by: INTERNAL MEDICINE

## 2023-12-04 PROCEDURE — 3044F HG A1C LEVEL LT 7.0%: CPT | Mod: CPTII,,, | Performed by: INTERNAL MEDICINE

## 2023-12-04 PROCEDURE — 1123F ACP DISCUSS/DSCN MKR DOCD: CPT | Mod: CPTII,,, | Performed by: INTERNAL MEDICINE

## 2023-12-04 PROCEDURE — 90677 PCV20 VACCINE IM: CPT | Mod: ,,, | Performed by: INTERNAL MEDICINE

## 2023-12-04 PROCEDURE — 3288F FALL RISK ASSESSMENT DOCD: CPT | Mod: CPTII,,, | Performed by: INTERNAL MEDICINE

## 2023-12-04 PROCEDURE — 3078F DIAST BP <80 MM HG: CPT | Mod: CPTII,,, | Performed by: INTERNAL MEDICINE

## 2023-12-04 PROCEDURE — 3078F PR MOST RECENT DIASTOLIC BLOOD PRESSURE < 80 MM HG: ICD-10-PCS | Mod: CPTII,,, | Performed by: INTERNAL MEDICINE

## 2023-12-04 PROCEDURE — 1159F MED LIST DOCD IN RCRD: CPT | Mod: CPTII,,, | Performed by: INTERNAL MEDICINE

## 2023-12-04 PROCEDURE — G0008 FLU VACCINE - QUADRIVALENT - ADJUVANTED: ICD-10-PCS | Mod: ,,, | Performed by: INTERNAL MEDICINE

## 2023-12-04 PROCEDURE — G0009 PNEUMOCOCCAL CONJUGATE VACCINE 20-VALENT: ICD-10-PCS | Mod: ,,, | Performed by: INTERNAL MEDICINE

## 2023-12-04 PROCEDURE — G0008 ADMIN INFLUENZA VIRUS VAC: HCPCS | Mod: ,,, | Performed by: INTERNAL MEDICINE

## 2023-12-04 PROCEDURE — 3288F PR FALLS RISK ASSESSMENT DOCUMENTED: ICD-10-PCS | Mod: CPTII,,, | Performed by: INTERNAL MEDICINE

## 2023-12-04 PROCEDURE — 4010F PR ACE/ARB THEARPY RXD/TAKEN: ICD-10-PCS | Mod: CPTII,,, | Performed by: INTERNAL MEDICINE

## 2023-12-04 PROCEDURE — 1101F PT FALLS ASSESS-DOCD LE1/YR: CPT | Mod: CPTII,,, | Performed by: INTERNAL MEDICINE

## 2023-12-04 PROCEDURE — 4010F ACE/ARB THERAPY RXD/TAKEN: CPT | Mod: CPTII,,, | Performed by: INTERNAL MEDICINE

## 2023-12-04 PROCEDURE — G0439 PR MEDICARE ANNUAL WELLNESS SUBSEQUENT VISIT: ICD-10-PCS | Mod: ,,, | Performed by: INTERNAL MEDICINE

## 2023-12-04 PROCEDURE — G0439 PPPS, SUBSEQ VISIT: HCPCS | Mod: ,,, | Performed by: INTERNAL MEDICINE

## 2023-12-04 PROCEDURE — 1159F PR MEDICATION LIST DOCUMENTED IN MEDICAL RECORD: ICD-10-PCS | Mod: CPTII,,, | Performed by: INTERNAL MEDICINE

## 2023-12-04 PROCEDURE — 90677 PNEUMOCOCCAL CONJUGATE VACCINE 20-VALENT: ICD-10-PCS | Mod: ,,, | Performed by: INTERNAL MEDICINE

## 2023-12-04 PROCEDURE — 1125F PR PAIN SEVERITY QUANTIFIED, PAIN PRESENT: ICD-10-PCS | Mod: CPTII,,, | Performed by: INTERNAL MEDICINE

## 2023-12-04 PROCEDURE — 1125F AMNT PAIN NOTED PAIN PRSNT: CPT | Mod: CPTII,,, | Performed by: INTERNAL MEDICINE

## 2023-12-04 PROCEDURE — 3044F PR MOST RECENT HEMOGLOBIN A1C LEVEL <7.0%: ICD-10-PCS | Mod: CPTII,,, | Performed by: INTERNAL MEDICINE

## 2023-12-04 PROCEDURE — 1160F RVW MEDS BY RX/DR IN RCRD: CPT | Mod: CPTII,,, | Performed by: INTERNAL MEDICINE

## 2023-12-04 PROCEDURE — 3074F PR MOST RECENT SYSTOLIC BLOOD PRESSURE < 130 MM HG: ICD-10-PCS | Mod: CPTII,,, | Performed by: INTERNAL MEDICINE

## 2023-12-04 PROCEDURE — 1123F PR ADV CARE PLAN DISCUSSED, PLAN OR SURROGATE DOCUMENTED: ICD-10-PCS | Mod: CPTII,,, | Performed by: INTERNAL MEDICINE

## 2023-12-04 RX ORDER — CETIRIZINE HYDROCHLORIDE 5 MG/1
5 TABLET ORAL DAILY
COMMUNITY

## 2023-12-05 NOTE — PATIENT INSTRUCTIONS
Hi [place],     If you are due for any health screening(s) below please notify me so we can arrange them to be ordered and scheduled. Most healthy patients at your age complete them, but you are free to accept or refuse.     If you can't do it, I'll definitely understand. If you can, I'd certainly appreciate it!    All of your core healthy metrics are met.    5-10 year preventative plan discussed with patient.

## 2024-01-03 ENCOUNTER — TELEPHONE (OUTPATIENT)
Dept: PRIMARY CARE CLINIC | Facility: CLINIC | Age: 69
End: 2024-01-03
Payer: MEDICARE

## 2024-01-03 ENCOUNTER — HOSPITAL ENCOUNTER (OUTPATIENT)
Dept: RADIOLOGY | Facility: HOSPITAL | Age: 69
Discharge: HOME | End: 2024-01-03
Attending: INTERNAL MEDICINE
Payer: MEDICARE

## 2024-01-03 DIAGNOSIS — G89.29 CHRONIC PAIN OF LEFT KNEE: ICD-10-CM

## 2024-01-03 DIAGNOSIS — M25.562 CHRONIC PAIN OF LEFT KNEE: Primary | ICD-10-CM

## 2024-01-03 DIAGNOSIS — G89.29 CHRONIC PAIN OF LEFT KNEE: Primary | ICD-10-CM

## 2024-01-03 DIAGNOSIS — M25.562 CHRONIC PAIN OF LEFT KNEE: ICD-10-CM

## 2024-01-03 PROCEDURE — 73560 X-RAY EXAM OF KNEE 1 OR 2: CPT | Mod: TC,LT

## 2024-01-03 NOTE — TELEPHONE ENCOUNTER
Patient called stating he is still have Left knee pain, asking for an x-ray to see what is going on.

## 2024-01-04 NOTE — TELEPHONE ENCOUNTER
Result given to patient states pain is behind knee up to mid thigh down leg to top of his foot ok with referral.

## 2024-01-04 NOTE — TELEPHONE ENCOUNTER
----- Message from Dayan Osullivan MD sent at 1/3/2024  5:49 PM CST -----  It shows arthritis, the joint space is well maintained. If it keeps up we will set him up with Ortho

## 2024-03-13 RX ORDER — LOVASTATIN 40 MG/1
TABLET ORAL
Qty: 90 TABLET | Refills: 3 | Status: SHIPPED | OUTPATIENT
Start: 2024-03-13

## 2024-05-29 ENCOUNTER — TELEPHONE (OUTPATIENT)
Dept: PRIMARY CARE CLINIC | Facility: CLINIC | Age: 69
End: 2024-05-29
Payer: MEDICARE

## 2024-06-03 RX ORDER — TADALAFIL 20 MG/1
TABLET, FILM COATED ORAL
COMMUNITY
Start: 2024-03-04

## 2024-06-03 RX ORDER — LEVOCETIRIZINE DIHYDROCHLORIDE 5 MG/1
5 TABLET, FILM COATED ORAL NIGHTLY
COMMUNITY
Start: 2024-04-01

## 2024-06-03 NOTE — PROGRESS NOTES
Dayan Osullivan MD   [address] [address]     Patient ID: [id number]     Chief Complaint: 6 Months follow up for HTN (Complain of left knee pain)        HPI:     Eugene Gayle Jr. is a 68 y.o. male here today for a follow up of HTN, ASHA, CAD, and obesity. He has been doing ok. He's still working. He inured his knee and got an injection in the lateral left knee. It's hurting in the back now. He has changed jobs but will still do a lot of standing there as .       Subjective:     Review of Systems   HENT:          Ear was itching and he though it was wax.    Respiratory:  Negative for cough and wheezing.        Past Medical History:   Diagnosis Date    CAD (coronary artery disease)     Cataract of both eyes 05/17/2022    GERD (gastroesophageal reflux disease)     Hyperlipidemia     Hypertension     Obesity, unspecified     Orchitis and epididymitis 05/23/2022    ASHA on CPAP     Partial thickness rotator cuff tear     Personal history of colonic polyps 10/24/2019    Mandi Hobbs MD  [facility]    Pneumonia, unspecified organism     Postprandial diarrhea     Prediabetes     Skin cancer     Tubular adenoma of colon 11/22/2022        Past Surgical History:   Procedure Laterality Date    APPENDECTOMY      BRONCHOSCOPY      CARDIAC CATHETERIZATION      COLONOSCOPY  10/24/2019    Mandi Hobbs MD  [facility]    COLONOSCOPY W/ BIOPSIES  11/16/2022    FINGER SURGERY      ROTATOR CUFF REPAIR  05/24/2018    SINUS SURGERY         Family History   Problem Relation Name Age of Onset    COPD Mother      Hypertension Mother      Heart disease Mother      Kidney failure Mother      Hypertension Father      Lung cancer Father      Prostate cancer Father      COPD Brother          Social History     Socioeconomic History    Marital status: Single   Tobacco Use    Smoking status: Former    Smokeless tobacco: Never   Substance and Sexual Activity    Alcohol use: Yes      Alcohol/week: 2.0 standard drinks of alcohol     Types: 2 Cans of beer per week    Drug use: No    Sexual activity: Yes     Social Determinants of Health     Financial Resource Strain: Low Risk  (5/28/2024)    Overall Financial Resource Strain (CARDIA)     Difficulty of Paying Living Expenses: Not hard at all   Food Insecurity: No Food Insecurity (5/28/2024)    Hunger Vital Sign     Worried About Running Out of Food in the Last Year: Never true     Ran Out of Food in the Last Year: Never true   Transportation Needs: No Transportation Needs (12/4/2023)    PRAPARE - Transportation     Lack of Transportation (Medical): No     Lack of Transportation (Non-Medical): No   Physical Activity: Sufficiently Active (5/28/2024)    Exercise Vital Sign     Days of Exercise per Week: 7 days     Minutes of Exercise per Session: 60 min   Stress: Stress Concern Present (5/28/2024)    [employer] [place] [employer] - Occupational Stress Questionnaire     Feeling of Stress : Very much   Housing Stability: Low Risk  (12/4/2023)    Housing Stability Vital Sign     Unable to Pay for Housing in the Last Year: No     Number of Places Lived in the Last Year: 1     Unstable Housing in the Last Year: No       Review of patient's allergies indicates:   Allergen Reactions    Ciprofloxacin Itching and Rash       Outpatient Medications Marked as Taking for the 6/4/24 encounter (Office Visit) with Dayan Osullivan MD   Medication Sig Dispense Refill    albuterol (PROVENTIL/VENTOLIN HFA) 90 mcg/actuation inhaler Inhale 2 puffs into the lungs every 6 (six) hours as needed for Wheezing. Rescue 18 g 5    aspirin (ECOTRIN) 81 MG EC tablet Take 81 mg by mouth once daily.      CIALIS 20 mg Tab TAKE NO MORE THAN 1 TABLET BY MOUTH EVERY 48 HOURS      esomeprazole (NEXIUM) 20 MG capsule Take 20 mg by mouth before breakfast.      fluticasone (FLONASE) 50 mcg/actuation nasal spray 1 spray (50 mcg total) by Each Nare route once daily. 16 g 5     "glucosamine-chondroitin 500-400 mg tablet Take 1 tablet by mouth 3 (three) times daily.      levocetirizine (XYZAL) 5 MG tablet Take 5 mg by mouth every evening.      lisinopriL (PRINIVIL,ZESTRIL) 20 MG tablet Take 20 mg by mouth once daily.      lovastatin (MEVACOR) 40 MG tablet TAKE 1 TABLET EVERY NIGHT 90 tablet 3    magnesium oxide (MAG-OX) 400 mg (241.3 mg magnesium) tablet Take 400 mg by mouth once daily.      tamsulosin (FLOMAX) 0.4 mg Cp24       vitamin E 200 UNIT capsule Take 200 Units by mouth once daily.         Patient Care Team:  Dayan Osullivan MD as PCP - General (Internal Medicine)  Derrick Costa MD as Consulting Physician (Cardiology)  Mandi Hobbs III, MD as Consulting Physician (Gastroenterology)  Ronny Ross MD as Consulting Physician (Pulmonary Disease)  Boom Casper Jr., MD as Consulting Physician (Dermatology)  Peyton Marte PA-C (Dermatology)  Claudia Bullock DPM as Consulting Physician (Podiatry)  Dane Polanco MD as Consulting Physician (Urology)  Stan Landry MD as Consulting Physician (Orthopedic Surgery)       Objective:     /70   Pulse 70   Temp 97.6 °F (36.4 °C) (Oral)   Resp 15   Ht 5' 7" (1.702 m)   Wt 97.5 kg (215 lb)   SpO2 95%   BMI 33.67 kg/m²     Physical Exam  Vitals reviewed.   Constitutional:       Appearance: Normal appearance.   HENT:      Right Ear: Tympanic membrane, ear canal and external ear normal.      Ears:      Comments: Left canal is red     Mouth/Throat:      Mouth: Mucous membranes are moist.      Pharynx: No oropharyngeal exudate or posterior oropharyngeal erythema.   Cardiovascular:      Rate and Rhythm: Normal rate and regular rhythm.   Pulmonary:      Effort: Pulmonary effort is normal.      Breath sounds: Normal breath sounds.   Skin:     General: Skin is warm and dry.   Neurological:      Mental Status: He is alert.               Assessment/Plan:     1. Primary hypertension  Comments:  Did lab for " Igor, need to get and add what else is due, not sure if he needs refill on Lisinopril at [facility]    2. Obstructive sleep apnea syndrome  Comments:  Compliant with his CPAP, they are wanting to replace with newer but he's doing well on what he has    3. Arteriosclerosis of coronary artery  Comments:  DId lab for Igor, will need to obtain, I'm sure it was his lipid    4. COPD with asthma  Comments:  He's stopped his SIngulair and he stopped the controller and felt worse, he has not used his Albuterol in ages and doesn't want more, goes to pulmonary in Sept    5. Other infective acute otitis externa of left ear  Comments:  Add cortisporin on left    Other orders  -     neomycin-polymyxin-hydrocortisone (CORTISPORIN) 3.5-10,000-1 mg/mL-unit/mL-% otic suspension; Place 3 drops into the left ear 4 (four) times daily.  Dispense: 10 mL; Refill: 1             Follow up in about 5 months (around 11/13/2024) for Wellness. In addition to their scheduled follow up, the patient has also been instructed to follow up on as needed basis.     Signature:  Dayan Osullivan MD  [facility]  [address] [place] [address]

## 2024-06-04 ENCOUNTER — OFFICE VISIT (OUTPATIENT)
Dept: PRIMARY CARE CLINIC | Facility: CLINIC | Age: 69
End: 2024-06-04
Payer: MEDICARE

## 2024-06-04 VITALS
OXYGEN SATURATION: 95 % | DIASTOLIC BLOOD PRESSURE: 70 MMHG | RESPIRATION RATE: 15 BRPM | SYSTOLIC BLOOD PRESSURE: 126 MMHG | HEART RATE: 70 BPM | BODY MASS INDEX: 33.74 KG/M2 | TEMPERATURE: 98 F | HEIGHT: 67 IN | WEIGHT: 215 LBS

## 2024-06-04 DIAGNOSIS — J44.89 COPD WITH ASTHMA: ICD-10-CM

## 2024-06-04 DIAGNOSIS — H60.392 OTHER INFECTIVE ACUTE OTITIS EXTERNA OF LEFT EAR: ICD-10-CM

## 2024-06-04 DIAGNOSIS — I10 PRIMARY HYPERTENSION: Primary | ICD-10-CM

## 2024-06-04 DIAGNOSIS — G47.33 OBSTRUCTIVE SLEEP APNEA SYNDROME: ICD-10-CM

## 2024-06-04 DIAGNOSIS — I25.10 ARTERIOSCLEROSIS OF CORONARY ARTERY: ICD-10-CM

## 2024-06-04 PROCEDURE — 1159F MED LIST DOCD IN RCRD: CPT | Mod: CPTII,,, | Performed by: INTERNAL MEDICINE

## 2024-06-04 PROCEDURE — 99214 OFFICE O/P EST MOD 30 MIN: CPT | Mod: ,,, | Performed by: INTERNAL MEDICINE

## 2024-06-04 PROCEDURE — 4010F ACE/ARB THERAPY RXD/TAKEN: CPT | Mod: CPTII,,, | Performed by: INTERNAL MEDICINE

## 2024-06-04 PROCEDURE — 1125F AMNT PAIN NOTED PAIN PRSNT: CPT | Mod: CPTII,,, | Performed by: INTERNAL MEDICINE

## 2024-06-04 PROCEDURE — 3008F BODY MASS INDEX DOCD: CPT | Mod: CPTII,,, | Performed by: INTERNAL MEDICINE

## 2024-06-04 PROCEDURE — 1157F ADVNC CARE PLAN IN RCRD: CPT | Mod: CPTII,,, | Performed by: INTERNAL MEDICINE

## 2024-06-04 PROCEDURE — 3074F SYST BP LT 130 MM HG: CPT | Mod: CPTII,,, | Performed by: INTERNAL MEDICINE

## 2024-06-04 PROCEDURE — 3078F DIAST BP <80 MM HG: CPT | Mod: CPTII,,, | Performed by: INTERNAL MEDICINE

## 2024-06-04 PROCEDURE — 1160F RVW MEDS BY RX/DR IN RCRD: CPT | Mod: CPTII,,, | Performed by: INTERNAL MEDICINE

## 2024-06-04 RX ORDER — NEOMYCIN SULFATE, POLYMYXIN B SULFATE AND HYDROCORTISONE 10; 3.5; 1 MG/ML; MG/ML; [USP'U]/ML
3 SUSPENSION/ DROPS AURICULAR (OTIC) 4 TIMES DAILY
Qty: 10 ML | Refills: 1 | Status: SHIPPED | OUTPATIENT
Start: 2024-06-04

## 2024-06-27 ENCOUNTER — TELEPHONE (OUTPATIENT)
Dept: SLEEP MEDICINE | Facility: CLINIC | Age: 69
End: 2024-06-27
Payer: MEDICARE

## 2024-08-19 PROBLEM — I73.9 PAD (PERIPHERAL ARTERY DISEASE): Status: ACTIVE | Noted: 2024-08-19

## 2024-08-21 ENCOUNTER — OFFICE VISIT (OUTPATIENT)
Dept: PRIMARY CARE CLINIC | Facility: CLINIC | Age: 69
End: 2024-08-21
Payer: MEDICARE

## 2024-08-21 VITALS
HEART RATE: 82 BPM | OXYGEN SATURATION: 94 % | DIASTOLIC BLOOD PRESSURE: 78 MMHG | TEMPERATURE: 100 F | SYSTOLIC BLOOD PRESSURE: 133 MMHG

## 2024-08-21 DIAGNOSIS — U07.1 COVID-19: Primary | ICD-10-CM

## 2024-08-21 DIAGNOSIS — R03.0 ELEVATED BLOOD PRESSURE READING: ICD-10-CM

## 2024-08-21 DIAGNOSIS — R09.82 POST-NASAL DRIP: ICD-10-CM

## 2024-08-21 PROCEDURE — 3075F SYST BP GE 130 - 139MM HG: CPT | Mod: CPTII,,, | Performed by: INTERNAL MEDICINE

## 2024-08-21 PROCEDURE — 1157F ADVNC CARE PLAN IN RCRD: CPT | Mod: CPTII,,, | Performed by: INTERNAL MEDICINE

## 2024-08-21 PROCEDURE — 3078F DIAST BP <80 MM HG: CPT | Mod: CPTII,,, | Performed by: INTERNAL MEDICINE

## 2024-08-21 PROCEDURE — 99213 OFFICE O/P EST LOW 20 MIN: CPT | Mod: ,,, | Performed by: INTERNAL MEDICINE

## 2024-08-21 PROCEDURE — 1160F RVW MEDS BY RX/DR IN RCRD: CPT | Mod: CPTII,,, | Performed by: INTERNAL MEDICINE

## 2024-08-21 PROCEDURE — 1159F MED LIST DOCD IN RCRD: CPT | Mod: CPTII,,, | Performed by: INTERNAL MEDICINE

## 2024-08-21 PROCEDURE — 4010F ACE/ARB THERAPY RXD/TAKEN: CPT | Mod: CPTII,,, | Performed by: INTERNAL MEDICINE

## 2024-08-21 NOTE — PROGRESS NOTES
Dayan Osullivan MD   [address] [address]     Patient ID: [id number]     Chief Complaint: Follow up to check lungs and sinus        HPI:     Eugene Gayle Jr. is a 69 y.o. male here today for an Urgent Care follow up. He was seen yesterday at Urgent Care for his sinus. They gave him the steroid injection but then tested him for Covid and before they had left the room from injecting him they knocked and said his Covid test was positive. This is no different from his usual sinus episodes and he's feeling much better since he had the shot. It always took 15 minutes before to get results so he doesn't believe they tested him. They gave him Paxlovid and instructions to wear a mask 5 days after return to work. He has not had any fever or dyspnea.  While there his BP was elevated at 170/ but he says that always happens when his sinus are keeping him awake at night. He read the instructions on the Paxlovid and he has not started it. He feels fine now and would have to stop his statin for several days. 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       Subjective:     Review of Systems   Constitutional:  Negative for chills and fever.   Respiratory:  Positive for cough. Negative for sputum production, shortness of breath and wheezing.         Cough is when he feels the drip down the back of his throat.    Cardiovascular:  Negative for chest pain and palpitations.       Past Medical History:   Diagnosis Date    CAD (coronary artery disease)     Cataract of both eyes 05/17/2022    GERD (gastroesophageal reflux disease)     Hyperlipidemia     Hypertension     Obesity, unspecified     Orchitis and epididymitis 05/23/2022    ASHA on CPAP     PAD (peripheral artery disease) 08/19/2024    Partial thickness rotator cuff tear     Personal history of colonic polyps 10/24/2019    Mandi Hobbs MD  [facility]    Pneumonia, unspecified organism     Postprandial diarrhea      Prediabetes     Skin cancer     Tubular adenoma of colon 11/22/2022        Past Surgical History:   Procedure Laterality Date    APPENDECTOMY      BRONCHOSCOPY      CARDIAC CATHETERIZATION      COLONOSCOPY  10/24/2019    Mandi Hobbs MD  [facility]    COLONOSCOPY W/ BIOPSIES  11/16/2022    FINGER SURGERY      ROTATOR CUFF REPAIR  05/24/2018    SINUS SURGERY         Family History   Problem Relation Name Age of Onset    COPD Mother      Hypertension Mother      Heart disease Mother      Kidney failure Mother      Hypertension Father      Lung cancer Father      Prostate cancer Father      COPD Brother          Social History     Socioeconomic History    Marital status: Single   Tobacco Use    Smoking status: Former    Smokeless tobacco: Never   Substance and Sexual Activity    Alcohol use: Yes     Alcohol/week: 2.0 standard drinks of alcohol     Types: 2 Cans of beer per week    Drug use: No    Sexual activity: Yes     Social Determinants of Health     Financial Resource Strain: Low Risk  (5/28/2024)    Overall Financial Resource Strain (CARDIA)     Difficulty of Paying Living Expenses: Not hard at all   Food Insecurity: No Food Insecurity (5/28/2024)    Hunger Vital Sign     Worried About Running Out of Food in the Last Year: Never true     Ran Out of Food in the Last Year: Never true   Transportation Needs: No Transportation Needs (12/4/2023)    PRAPARE - Transportation     Lack of Transportation (Medical): No     Lack of Transportation (Non-Medical): No   Physical Activity: Sufficiently Active (5/28/2024)    Exercise Vital Sign     Days of Exercise per Week: 7 days     Minutes of Exercise per Session: 60 min   Stress: Stress Concern Present (5/28/2024)    [employer] [place] [employer] - Occupational Stress Questionnaire     Feeling of Stress : Very much   Housing Stability: Low Risk  (12/4/2023)    Housing Stability Vital Sign     Unable to Pay for Housing in the Last Year: No      Number of Places Lived in the Last Year: 1     Unstable Housing in the Last Year: No       Review of patient's allergies indicates:   Allergen Reactions    Ciprofloxacin Itching and Rash       No outpatient medications have been marked as taking for the 8/21/24 encounter (Office Visit) with Dayan Osullivan MD.       Patient Care Team:  Dayan Osullivan MD as PCP - General (Internal Medicine)  Derrick Costa MD as Consulting Physician (Cardiology)  Mandi Hobbs III, MD as Consulting Physician (Gastroenterology)  Ronny Ross MD as Consulting Physician (Pulmonary Disease)  Boom Casper Jr., MD as Consulting Physician (Dermatology)  Peyton Marte PA-C (Dermatology)  Claudia Bullock DPM as Consulting Physician (Podiatry)  Dane Polanco MD as Consulting Physician (Urology)  Stan Landry MD as Consulting Physician (Orthopedic Surgery)       Objective:     /78   Pulse 82   Temp 99.5 °F (37.5 °C)   SpO2 (!) 94%     Physical Exam  Vitals (Given mask after reported + Covid) reviewed.   Constitutional:       Appearance: He is not ill-appearing.   HENT:      Head: Normocephalic and atraumatic.   Cardiovascular:      Rate and Rhythm: Normal rate and regular rhythm.   Pulmonary:      Breath sounds: No wheezing, rhonchi or rales.   Skin:     General: Skin is warm and dry.      Coloration: Skin is not pale.   Neurological:      Mental Status: He is alert.   Psychiatric:         Mood and Affect: Mood normal.         Behavior: Behavior normal.         Thought Content: Thought content normal.         Judgment: Judgment normal.               Assessment/Plan:     1. COVID-19  Comments:  Explained with test strongly positive results often happen as soon as it reaches the reagent. As he feels well I would leave it up to him if he does Paxlovid.    2. Post-nasal drip  Comments:  Improved with steroids    3. Elevated blood pressure reading  Comments:  170 at Urgent Care but back to  normal now even after steroid injection             No follow-ups on file. In addition to their scheduled follow up, the patient has also been instructed to follow up on as needed basis.     Signature:  Dayan Osullivan MD  [facility]  [address] [address]

## 2024-09-19 ENCOUNTER — TELEPHONE (OUTPATIENT)
Dept: PRIMARY CARE CLINIC | Facility: CLINIC | Age: 69
End: 2024-09-19
Payer: MEDICARE

## 2024-09-19 DIAGNOSIS — M79.672 ACUTE FOOT PAIN, LEFT: ICD-10-CM

## 2024-09-19 DIAGNOSIS — M25.572 ACUTE LEFT ANKLE PAIN: Primary | ICD-10-CM

## 2024-09-19 NOTE — TELEPHONE ENCOUNTER
----- Message from Chalino Calero sent at 9/19/2024  8:13 AM CDT -----  Regarding: advice  Who Called: Eugene Gayle Jr.    Caller is requesting assistance/information from provider's office.    Symptoms (please be specific): Foot/ ankle issues   How long has patient had these symptoms:    List of preferred pharmacies on file (remove unneeded): [unfilled]  If different, enter pharmacy into here including location and phone number:       Preferred Method of Contact: Phone Call  Patient's Preferred Phone Number on File: [phone]   Best Call Back Number, if different:  Additional Information: Pt called in regards to having pain around his foot/ ankle and is requesting x-rays to be done if possible. Pt is scheduled for 10/01, but wants to get xrays done at [facility] as soon as possible. Please advise.

## 2024-09-19 NOTE — TELEPHONE ENCOUNTER
I left a message for the patient letting him know the orders were placed and he can go before his appointment to have them done.

## 2024-09-23 ENCOUNTER — HOSPITAL ENCOUNTER (OUTPATIENT)
Dept: RADIOLOGY | Facility: HOSPITAL | Age: 69
Discharge: HOME | End: 2024-09-23
Attending: INTERNAL MEDICINE
Payer: MEDICARE

## 2024-09-23 DIAGNOSIS — M25.572 ACUTE LEFT ANKLE PAIN: ICD-10-CM

## 2024-09-23 DIAGNOSIS — M79.672 ACUTE FOOT PAIN, LEFT: ICD-10-CM

## 2024-09-23 PROCEDURE — 73630 X-RAY EXAM OF FOOT: CPT | Mod: TC,LT

## 2024-09-23 PROCEDURE — 73610 X-RAY EXAM OF ANKLE: CPT | Mod: TC,LT

## 2024-09-24 ENCOUNTER — TELEPHONE (OUTPATIENT)
Dept: PRIMARY CARE CLINIC | Facility: CLINIC | Age: 69
End: 2024-09-24
Payer: MEDICARE

## 2024-09-25 ENCOUNTER — TELEPHONE (OUTPATIENT)
Dept: PRIMARY CARE CLINIC | Facility: CLINIC | Age: 69
End: 2024-09-25
Payer: MEDICARE

## 2024-09-25 NOTE — TELEPHONE ENCOUNTER
----- Message from Dayan Osullivan MD sent at 9/24/2024 12:32 PM CDT -----  It does show arthritis. If it keeps acting up we'll set him up with specialist, either podiatry or the ortho at [facility] that does feet and ankles (Frantz)

## 2024-12-02 ENCOUNTER — TELEPHONE (OUTPATIENT)
Dept: PRIMARY CARE CLINIC | Facility: CLINIC | Age: 69
End: 2024-12-02
Payer: MEDICARE

## 2024-12-02 DIAGNOSIS — J44.89 COPD WITH ASTHMA: ICD-10-CM

## 2024-12-02 DIAGNOSIS — I25.10 ATHEROSCLEROSIS OF NATIVE CORONARY ARTERY OF NATIVE HEART WITHOUT ANGINA PECTORIS: Primary | ICD-10-CM

## 2024-12-02 DIAGNOSIS — G47.33 OSA (OBSTRUCTIVE SLEEP APNEA): ICD-10-CM

## 2024-12-02 DIAGNOSIS — R73.03 PREDIABETES: ICD-10-CM

## 2024-12-02 DIAGNOSIS — I10 PRIMARY HYPERTENSION: ICD-10-CM

## 2024-12-02 NOTE — TELEPHONE ENCOUNTER
----- Message from Piotr sent at 12/2/2024 12:00 PM CST -----  .Who Called: Eugene Gayle Jr.    What order is the patient requesting: wellness   When does the expect the orders to be performed? 12/10/24        Preferred Method of Contact: Phone Call  Patient's Preferred Phone Number on File: [phone]   Best Call Back Number, if different:  Additional Information:

## 2024-12-04 LAB
ALBUMIN SERPL-MCNC: 4.6 G/DL (ref 3.8–4.8)
ALP SERPL-CCNC: 78 IU/L (ref 44–121)
ALT SERPL-CCNC: 18 IU/L (ref 0–44)
APPEARANCE UR: CLEAR
AST SERPL-CCNC: 18 IU/L (ref 0–40)
BASOPHILS # BLD AUTO: 0 X10E3/UL (ref 0–0.2)
BASOPHILS NFR BLD AUTO: 1 %
BILIRUB SERPL-MCNC: 0.4 MG/DL (ref 0–1.2)
BILIRUB UR QL STRIP: NEGATIVE
BUN SERPL-MCNC: 26 MG/DL (ref 8–27)
BUN/CREAT SERPL: 26 (ref 10–24)
CALCIUM SERPL-MCNC: 9.1 MG/DL (ref 8.6–10.2)
CHLORIDE SERPL-SCNC: 100 MMOL/L (ref 96–106)
CHOLEST SERPL-MCNC: 189 MG/DL (ref 100–199)
CO2 SERPL-SCNC: 23 MMOL/L (ref 20–29)
COLOR UR: YELLOW
CREAT SERPL-MCNC: 1 MG/DL (ref 0.76–1.27)
EOSINOPHIL # BLD AUTO: 0.1 X10E3/UL (ref 0–0.4)
EOSINOPHIL NFR BLD AUTO: 2 %
ERYTHROCYTE [DISTWIDTH] IN BLOOD BY AUTOMATED COUNT: 12.3 % (ref 11.6–15.4)
EST. GFR  (NO RACE VARIABLE): 80 ML/MIN/1.73
GLOBULIN SER CALC-MCNC: 2.3 G/DL (ref 1.5–4.5)
GLUCOSE SERPL-MCNC: 91 MG/DL (ref 70–99)
GLUCOSE UR QL STRIP: NEGATIVE
HBA1C MFR BLD: 5.6 % (ref 4.8–5.6)
HCT VFR BLD AUTO: 46.9 % (ref 37.5–51)
HDLC SERPL-MCNC: 69 MG/DL
HGB BLD-MCNC: 15.3 G/DL (ref 13–17.7)
HGB UR QL STRIP: NEGATIVE
IMM GRANULOCYTES NFR BLD AUTO: 1 %
KETONES UR QL STRIP: NEGATIVE
LDLC SERPL CALC-MCNC: 99 MG/DL (ref 0–99)
LEUKOCYTE ESTERASE UR QL STRIP: NEGATIVE
LYMPHOCYTES # BLD AUTO: 1.7 X10E3/UL (ref 0.7–3.1)
LYMPHOCYTES NFR BLD AUTO: 26 %
MCH RBC QN AUTO: 28.4 PG (ref 26.6–33)
MCHC RBC AUTO-ENTMCNC: 32.6 G/DL (ref 31.5–35.7)
MCV RBC AUTO: 87 FL (ref 79–97)
MICRO URNS: NORMAL
MONOCYTES # BLD AUTO: 0.6 X10E3/UL (ref 0.1–0.9)
MONOCYTES NFR BLD AUTO: 9 %
NEUTROPHILS # BLD AUTO: 4 X10E3/UL (ref 1.4–7)
NEUTROPHILS NFR BLD AUTO: 61 %
NITRITE UR QL STRIP: NEGATIVE
PH UR STRIP: 6.5 [PH] (ref 5–7.5)
PLATELET # BLD AUTO: 213 X10E3/UL (ref 150–450)
POTASSIUM SERPL-SCNC: 4.6 MMOL/L (ref 3.5–5.2)
PROT SERPL-MCNC: 6.9 G/DL (ref 6–8.5)
PROT UR QL STRIP: NEGATIVE
RBC # BLD AUTO: 5.39 X10E6/UL (ref 4.14–5.8)
SODIUM SERPL-SCNC: 137 MMOL/L (ref 134–144)
SP GR UR STRIP: 1.02 (ref 1–1.03)
TRIGL SERPL-MCNC: 122 MG/DL (ref 0–149)
URATE SERPL-MCNC: 5.7 MG/DL (ref 3.8–8.4)
UROBILINOGEN UR STRIP-MCNC: 0.2 MG/DL (ref 0.2–1)
VLDLC SERPL CALC-MCNC: 21 MG/DL (ref 5–40)
WBC # BLD AUTO: 6.6 X10E3/UL (ref 3.4–10.8)

## 2024-12-09 RX ORDER — METHYLPREDNISOLONE 4 MG/1
TABLET ORAL
COMMUNITY
Start: 2024-11-29 | End: 2024-12-10 | Stop reason: ALTCHOICE

## 2024-12-09 NOTE — PROGRESS NOTES
Patient ID: [id number]     Chief Complaint: Medicare AWV      HPI:     Eugene Gayle Jr. is a 69 y.o. male here today for a Medicare Wellness. He's seen Dr Costa and everything was good, he good he goes back in a year. He did an angiogram and it was really good. He has been to [facility] for sinus a few times since last here. He gets cortisone shots and it's good a few days.       Opioid Screening: Patient medication list reviewed, patient is not taking prescription opioids. Patient is not using additional opioids than prescribed. Patient is at low risk of substance abuse based on this opioid use history.       Past Medical History:   Diagnosis Date    CAD (coronary artery disease)     Cataract of both eyes 05/17/2022    GERD (gastroesophageal reflux disease)     Hyperlipidemia     Hypertension     Obesity, unspecified     Orchitis and epididymitis 05/23/2022    ASHA on CPAP     PAD (peripheral artery disease) 08/19/2024    Partial thickness rotator cuff tear     Personal history of colonic polyps 10/24/2019    Mandi Hobbs MD  [facility]    Pneumonia, unspecified organism     Postprandial diarrhea     Prediabetes     Skin cancer     Tubular adenoma of colon 11/22/2022        Past Surgical History:   Procedure Laterality Date    APPENDECTOMY      BRONCHOSCOPY      CARDIAC CATHETERIZATION      COLONOSCOPY  10/24/2019    Mandi Hobbs MD  [facility] [place]    COLONOSCOPY W/ BIOPSIES  11/16/2022    FINGER SURGERY      ROTATOR CUFF REPAIR  05/24/2018    SINUS SURGERY         Review of patient's allergies indicates:   Allergen Reactions    Ciprofloxacin Itching and Rash       Outpatient Medications Marked as Taking for the 12/10/24 encounter (Office Visit) with Dayan Osullivan MD   Medication Sig Dispense Refill    aspirin (ECOTRIN) 81 MG EC tablet Take 81 mg by mouth once daily.      cetirizine (ZYRTEC) 10 MG tablet Take 10 mg by mouth once daily.      CIALIS 20 mg Tab TAKE NO  MORE THAN 1 TABLET BY MOUTH EVERY 48 HOURS      esomeprazole (NEXIUM) 20 MG capsule Take 20 mg by mouth before breakfast.      glucosamine-chondroitin 500-400 mg tablet Take 1 tablet by mouth 3 (three) times daily.      lisinopriL (PRINIVIL,ZESTRIL) 20 MG tablet Take 20 mg by mouth once daily.      lovastatin (MEVACOR) 40 MG tablet TAKE 1 TABLET EVERY NIGHT 90 tablet 3    magnesium oxide (MAG-OX) 400 mg (241.3 mg magnesium) tablet Take 400 mg by mouth once daily.      tamsulosin (FLOMAX) 0.4 mg Cp24       vitamin E 200 UNIT capsule Take 200 Units by mouth once daily.       Current Facility-Administered Medications for the 12/10/24 encounter (Office Visit) with Dayan Osullivan MD   Medication Dose Route Frequency Provider Last Rate Last Admin    influenza (adjuvanted) (Fluad) 45 mcg/0.5 mL IM vaccine (> or = 64 yo) 0.5 mL  0.5 mL Intramuscular 1 time in Clinic/HOD            Social History     Socioeconomic History    Marital status: Single   Tobacco Use    Smoking status: Former    Smokeless tobacco: Never   Substance and Sexual Activity    Alcohol use: Yes     Alcohol/week: 2.0 standard drinks of alcohol     Types: 2 Cans of beer per week    Drug use: No    Sexual activity: Yes     Social Drivers of Health     Financial Resource Strain: Low Risk  (12/10/2024)    Overall Financial Resource Strain (CARDIA)     Difficulty of Paying Living Expenses: Not hard at all   Food Insecurity: No Food Insecurity (12/10/2024)    Hunger Vital Sign     Worried About Running Out of Food in the Last Year: Never true     Ran Out of Food in the Last Year: Never true   Transportation Needs: No Transportation Needs (12/10/2024)    TRANSPORTATION NEEDS     Transportation : No   Physical Activity: Inactive (12/10/2024)    Exercise Vital Sign     Days of Exercise per Week: 0 days     Minutes of Exercise per Session: 0 min   Stress: No Stress Concern Present (12/10/2024)    [employer] [place] [employer] - Occupational Stress  Questionnaire     Feeling of Stress : Not at all   Housing Stability: Low Risk  (12/10/2024)    Housing Stability Vital Sign     Unable to Pay for Housing in the Last Year: No     Homeless in the Last Year: No        Family History   Problem Relation Name Age of Onset    COPD Mother      Hypertension Mother      Heart disease Mother      Kidney failure Mother      Hypertension Father      Lung cancer Father      Prostate cancer Father      Brain aneurysm Sister      COPD Brother      Aortic aneurysm Brother           from rupture age 50    Brain aneurysm Brother          Patient Care Team:  Dayan Osullivan MD as PCP - General (Internal Medicine)  Derrick Costa MD as Consulting Physician (Cardiology)  Mandi Hobbs III, MD as Consulting Physician (Gastroenterology)  Ronny Ross MD as Consulting Physician (Pulmonary Disease)  Boom Casper Jr., MD as Consulting Physician (Dermatology)  Peyton Marte PA-C (Dermatology)  Dane Polanco MD as Consulting Physician (Urology)  Stan Landry MD as Consulting Physician (Orthopedic Surgery)  Reji Coats DPM (Podiatry)       Subjective:     Review of Systems   Constitutional: Negative.    HENT:  Positive for congestion.         Doing Zyrtec, likes it better than Xyzal. His ENT retired, he hasn't got one right now   Eyes: Negative.    Respiratory:          His lung doctor was going back to school, not sure if he is still there in practice.    Cardiovascular:         Igor didn't do testing this time, said it is almost due so probably next visit   Gastrointestinal: Negative.    Genitourinary:         PSA was good at urology   Musculoskeletal:  Positive for joint pain.        Feet will hurt, he didn't have problems till after Carlo made him use inserts.    Skin: Negative.    Neurological:  Negative for dizziness, weakness and headaches.   Endo/Heme/Allergies: Negative.    Psychiatric/Behavioral: Negative.           Objective:  "    /78   Pulse 82   Temp 97.4 °F (36.3 °C) (Oral)   Resp 16   Ht 5' 7" (1.702 m)   Wt 101.6 kg (224 lb)   SpO2 (!) 94%   BMI 35.08 kg/m²     Physical Exam  Vitals reviewed.   Constitutional:       Appearance: He is obese.   HENT:      Head: Atraumatic.      Right Ear: Tympanic membrane, ear canal and external ear normal.      Left Ear: Tympanic membrane, ear canal and external ear normal.      Mouth/Throat:      Mouth: Mucous membranes are moist.   Eyes:      General: No scleral icterus.     Extraocular Movements: Extraocular movements intact.      Conjunctiva/sclera: Conjunctivae normal.      Pupils: Pupils are equal, round, and reactive to light.   Neck:      Vascular: No carotid bruit.   Cardiovascular:      Rate and Rhythm: Normal rate and regular rhythm.   Pulmonary:      Effort: Pulmonary effort is normal.      Breath sounds: Normal breath sounds.   Abdominal:      General: Bowel sounds are normal.      Palpations: Abdomen is soft.      Tenderness: There is no abdominal tenderness.   Musculoskeletal:         General: No swelling or tenderness.   Skin:     General: Skin is warm and dry.      Findings: No bruising.   Neurological:      General: No focal deficit present.      Mental Status: He is alert.      Motor: No weakness.      Gait: Gait normal.   Psychiatric:         Mood and Affect: Mood normal.         Behavior: Behavior normal.         Thought Content: Thought content normal.         Judgment: Judgment normal.           A comprehensive HEALTH RISK ASSESSMENT was completed today. Results are summarized below:    The following EMOTIONAL/SOCIAL CONCERNS were identified on today's screening for Social Isolation, Depression and Anxiety:  *Patient feels UNABLE TO MANAGE his HEALTH PROBLEMS. (Do you feel that you can control and manage most of your health problems?: (!) No)  <repeat PHQ-9>   There are NO COGNITIVE FUNCTION CONCERNS identified on today's screening.  The following FUNCTIONAL AND/OR " SAFETY CONCERNS were identified on today's screening for Physical Symptoms, Nutritional, Home Safety/Living Situation, Fall Risk, Activities of Daily Living, Independent Activities of Daily Living, Physical Activity,Timed Up and Go test and Whisper test::  *Patient reports NO ROUTINE EXERCISE. (On average, how many days per week do you engage in moderate to strenuous exercise (like a brisk walk)?: (!) 0)   *Patient reports NO PREVENTIVE HOME HAZARD EVALUATION OR MODIFICATION. (Have you or someone else evaluated or modified your home with additional safety features like handrails on all stairs, installed grab bars in the bathroom, secured loose rugs and ensured good lighting in all areas?: (!) No)    The patient reports NO OPIOID PRESCRIPTIONS. This was confirmed through medication reconciliation and the [facility] website.    The patient is NOT A TOBACCO USER.  The patient reports NO SIGNIFICANT ALCOHOL USE.     All Questions regarding food, transportation or housing were not answered today.    Telephone on 12/02/2024   Component Date Value    Specific Gravity, UA 12/04/2024 1.020     pH, UA 12/04/2024 6.5     Color, UA 12/04/2024 Yellow     Clarity, UA 12/04/2024 Clear     Leukocytes, UA 12/04/2024 Negative     Protein, UA 12/04/2024 Negative     Glucose, UA 12/04/2024 Negative     Ketones, UA 12/04/2024 Negative     Occult Blood UA 12/04/2024 Negative     Bilirubin, UA 12/04/2024 Negative     Urobilinogen, UA 12/04/2024 0.2     Nitrite, UA 12/04/2024 Negative     Microscopic Examination 12/04/2024 Comment     Uric Acid 12/04/2024 5.7     Cholesterol 12/04/2024 189     Triglycerides 12/04/2024 122     HDL 12/04/2024 69     VLDL Cholesterol Dick 12/04/2024 21     LDL Calculated 12/04/2024 99     Glucose 12/04/2024 91     BUN 12/04/2024 26     Creatinine 12/04/2024 1.00     eGFR 12/04/2024 80     BUN/Creatinine Ratio 12/04/2024 26 (H)     Sodium 12/04/2024 137     Potassium 12/04/2024 4.6     Chloride 12/04/2024 100     CO2  12/04/2024 23     Calcium 12/04/2024 9.1     Protein, Total 12/04/2024 6.9     Albumin 12/04/2024 4.6     Globulin, Total 12/04/2024 2.3     Total Bilirubin 12/04/2024 0.4     Alkaline Phosphatase 12/04/2024 78     AST 12/04/2024 18     ALT 12/04/2024 18     WBC 12/04/2024 6.6     RBC 12/04/2024 5.39     Hemoglobin 12/04/2024 15.3     Hematocrit 12/04/2024 46.9     MCV 12/04/2024 87     MCH 12/04/2024 28.4     MCHC 12/04/2024 32.6     RDW 12/04/2024 12.3     Platelets 12/04/2024 213     Neutrophils 12/04/2024 61     Lymphs 12/04/2024 26     Monocytes 12/04/2024 9     Eos 12/04/2024 2     Basos 12/04/2024 1     Neutrophils (Absolute) 12/04/2024 4.0     Lymphs (Absolute) 12/04/2024 1.7     Monocytes(Absolute) 12/04/2024 0.6     Eos (Absolute) 12/04/2024 0.1     Baso (Absolute) 12/04/2024 0.0     Immature Granulocytes 12/04/2024 1     Hemoglobin A1c 12/04/2024 5.6        Assessment/Plan:     1. Medicare annual wellness visit, subsequent  Comments:  Up to date on screening, discussed vaccines, he'll take flu and think about the others    2. Primary hypertension  Comments:  Acceptable control, tried digital medicine, his BP just kept going up it worried him so much    3. PAD (peripheral artery disease)  Comments:  Stable, no issues with claudication    4. Arteriosclerosis of coronary artery  Comments:  Last angiogram was good with Dr Costa    5. COPD with asthma  Comments:  If the group doesn't have option I can do referral to new lung doctor wherever he would like    6. Acquired hydrocele  Comments:  Has follow up    7. Microscopic hematuria  Comments:  Now sees Sherri for follow up after Aman retired.    8. Prediabetes  Comments:  Doing cinnamon and staying in  normal range now    9. Other stricture of urethral meatus in male    10. Postprandial diarrhea    11. Obstructive sleep apnea syndrome  Comments:  Stable    12. Benign prostatic hyperplasia with urinary frequency    13. Need for influenza vaccination  -      influenza (adjuvanted) (Fluad) 45 mcg/0.5 mL IM vaccine (> or = 64 yo) 0.5 mL    14. Family history of abdominal aortic aneurysm    15. Family history of brain aneurysm  Comments:  If HA would go immediately to ER, his sister had in 30's and brother found to have one in 50's at autopsy.    16. Severe obesity (BMI 35.0-39.9) with comorbidity           Medicare Annual Wellness and Personalized Prevention Plan:   Fall Risk + Home Safety + Hearing Impairment + Depression Screen + Opioid and Substance Abuse Screening + Cognitive Impairment Screen + Health Risk Assessment all reviewed.     Health Maintenance Topics with due status: Not Due       Topic Last Completion Date    Colorectal Cancer Screening 11/16/2022    Hemoglobin A1c (Prediabetes) 12/04/2024    Lipid Panel 12/04/2024    High Dose Statin 12/10/2024    Aspirin/Antiplatelet Therapy 12/10/2024      The patient's Health Maintenance was reviewed and the following appears to be due at this time:   Health Maintenance Due   Topic Date Due    TETANUS VACCINE  Never done    Influenza Vaccine (1) 09/01/2024       Advance Care Planning   I attest to discussing Advance Care Planning with patient and/or family member.  Education was provided including the importance of the Health Care Power of , Advance Directives, and/or [place] documentation.  The patient expressed understanding to the importance of this information and discussion.     Advance Care Planning     Date: 12/10/2024    Living Will  During this visit, I engaged the patient  in the voluntary advance care planning process.  The patient and I reviewed the role for advance directives and their purpose in directing future healthcare if the patient's unable to speak for him/herself.  At this point in time, the patient does have full decision-making capacity.  We discussed different extreme health states that he could experience, and reviewed what kind of medical care he would want in those situations.  The  patient communicated that if he were comatose and had little chance of a meaningful recovery, he would not want machines/life-sustaining treatments used. In addition to the above preference, other important end-of-life issues for the patient include  Quality of life is important . The patient has completed a living will to reflect these preferences.  I spent a total of 5 minutes engaging the patient in this advance care planning discussion.               Follow up in about 6 months (around 6/10/2025) for HTN. In addition to their scheduled follow up, the patient has also been instructed to follow up on as needed basis.

## 2024-12-09 NOTE — PATIENT INSTRUCTIONS
Hi Mertile,     If you are due for any health screening(s) below please notify me so we can arrange them to be ordered and scheduled. Most healthy patients at your age complete them, but you are free to accept or refuse.     If you can't do it, I'll definitely understand. If you can, I'd certainly appreciate it!    All of your core healthy metrics are met.        5-10 year preventative plan discussed.

## 2024-12-10 ENCOUNTER — OFFICE VISIT (OUTPATIENT)
Dept: PRIMARY CARE CLINIC | Facility: CLINIC | Age: 69
End: 2024-12-10
Payer: MEDICARE

## 2024-12-10 VITALS
RESPIRATION RATE: 16 BRPM | HEART RATE: 82 BPM | BODY MASS INDEX: 35.16 KG/M2 | WEIGHT: 224 LBS | HEIGHT: 67 IN | DIASTOLIC BLOOD PRESSURE: 78 MMHG | OXYGEN SATURATION: 94 % | TEMPERATURE: 97 F | SYSTOLIC BLOOD PRESSURE: 139 MMHG

## 2024-12-10 DIAGNOSIS — Z82.49 FAMILY HISTORY OF BRAIN ANEURYSM: ICD-10-CM

## 2024-12-10 DIAGNOSIS — G47.33 OBSTRUCTIVE SLEEP APNEA SYNDROME: ICD-10-CM

## 2024-12-10 DIAGNOSIS — I25.10 ARTERIOSCLEROSIS OF CORONARY ARTERY: ICD-10-CM

## 2024-12-10 DIAGNOSIS — N35.811 OTHER STRICTURE OF URETHRAL MEATUS IN MALE: ICD-10-CM

## 2024-12-10 DIAGNOSIS — Z82.49 FAMILY HISTORY OF ABDOMINAL AORTIC ANEURYSM: ICD-10-CM

## 2024-12-10 DIAGNOSIS — E66.01 SEVERE OBESITY (BMI 35.0-39.9) WITH COMORBIDITY: ICD-10-CM

## 2024-12-10 DIAGNOSIS — R35.0 BENIGN PROSTATIC HYPERPLASIA WITH URINARY FREQUENCY: ICD-10-CM

## 2024-12-10 DIAGNOSIS — R73.03 PREDIABETES: ICD-10-CM

## 2024-12-10 DIAGNOSIS — Z00.00 MEDICARE ANNUAL WELLNESS VISIT, SUBSEQUENT: Primary | ICD-10-CM

## 2024-12-10 DIAGNOSIS — K52.9 POSTPRANDIAL DIARRHEA: ICD-10-CM

## 2024-12-10 DIAGNOSIS — R31.29 MICROSCOPIC HEMATURIA: ICD-10-CM

## 2024-12-10 DIAGNOSIS — N43.3 ACQUIRED HYDROCELE: ICD-10-CM

## 2024-12-10 DIAGNOSIS — J44.89 COPD WITH ASTHMA: ICD-10-CM

## 2024-12-10 DIAGNOSIS — Z23 NEED FOR INFLUENZA VACCINATION: ICD-10-CM

## 2024-12-10 DIAGNOSIS — I10 PRIMARY HYPERTENSION: ICD-10-CM

## 2024-12-10 DIAGNOSIS — N40.1 BENIGN PROSTATIC HYPERPLASIA WITH URINARY FREQUENCY: ICD-10-CM

## 2024-12-10 DIAGNOSIS — I73.9 PAD (PERIPHERAL ARTERY DISEASE): ICD-10-CM

## 2024-12-10 PROCEDURE — 90653 IIV ADJUVANT VACCINE IM: CPT | Mod: ,,, | Performed by: INTERNAL MEDICINE

## 2024-12-10 PROCEDURE — 3288F FALL RISK ASSESSMENT DOCD: CPT | Mod: CPTII,,, | Performed by: INTERNAL MEDICINE

## 2024-12-10 PROCEDURE — G0439 PPPS, SUBSEQ VISIT: HCPCS | Mod: ,,, | Performed by: INTERNAL MEDICINE

## 2024-12-10 PROCEDURE — 3075F SYST BP GE 130 - 139MM HG: CPT | Mod: CPTII,,, | Performed by: INTERNAL MEDICINE

## 2024-12-10 PROCEDURE — 3044F HG A1C LEVEL LT 7.0%: CPT | Mod: CPTII,,, | Performed by: INTERNAL MEDICINE

## 2024-12-10 PROCEDURE — 3078F DIAST BP <80 MM HG: CPT | Mod: CPTII,,, | Performed by: INTERNAL MEDICINE

## 2024-12-10 PROCEDURE — 1159F MED LIST DOCD IN RCRD: CPT | Mod: CPTII,,, | Performed by: INTERNAL MEDICINE

## 2024-12-10 PROCEDURE — 4010F ACE/ARB THERAPY RXD/TAKEN: CPT | Mod: CPTII,,, | Performed by: INTERNAL MEDICINE

## 2024-12-10 PROCEDURE — 1101F PT FALLS ASSESS-DOCD LE1/YR: CPT | Mod: CPTII,,, | Performed by: INTERNAL MEDICINE

## 2024-12-10 PROCEDURE — 1160F RVW MEDS BY RX/DR IN RCRD: CPT | Mod: CPTII,,, | Performed by: INTERNAL MEDICINE

## 2024-12-10 PROCEDURE — 1123F ACP DISCUSS/DSCN MKR DOCD: CPT | Mod: CPTII,,, | Performed by: INTERNAL MEDICINE

## 2024-12-10 PROCEDURE — 1126F AMNT PAIN NOTED NONE PRSNT: CPT | Mod: CPTII,,, | Performed by: INTERNAL MEDICINE

## 2024-12-10 PROCEDURE — G0008 ADMIN INFLUENZA VIRUS VAC: HCPCS | Mod: ,,, | Performed by: INTERNAL MEDICINE

## 2024-12-10 RX ORDER — CETIRIZINE HYDROCHLORIDE 10 MG/1
10 TABLET ORAL DAILY
COMMUNITY

## 2025-01-01 RX ORDER — LOVASTATIN 40 MG/1
TABLET ORAL
Qty: 90 TABLET | Refills: 3 | Status: SHIPPED | OUTPATIENT
Start: 2025-01-01

## 2025-04-28 ENCOUNTER — TELEPHONE (OUTPATIENT)
Dept: PRIMARY CARE CLINIC | Facility: CLINIC | Age: 70
End: 2025-04-28
Payer: MEDICARE

## 2025-04-28 NOTE — TELEPHONE ENCOUNTER
Patient requesting MRI of left knee.   States he went to [facility] and they stated he needs to have this done so they can know what type of therapy he needs.

## 2025-04-28 NOTE — TELEPHONE ENCOUNTER
I left a message for the patient in regards to scheduling an appt in order to get his MRI ordered.

## 2025-04-30 ENCOUNTER — PATIENT MESSAGE (OUTPATIENT)
Dept: PRIMARY CARE CLINIC | Facility: CLINIC | Age: 70
End: 2025-04-30
Payer: MEDICARE

## 2025-05-05 ENCOUNTER — OFFICE VISIT (OUTPATIENT)
Dept: PRIMARY CARE CLINIC | Facility: CLINIC | Age: 70
End: 2025-05-05
Payer: MEDICARE

## 2025-05-05 VITALS
TEMPERATURE: 98 F | HEIGHT: 67 IN | BODY MASS INDEX: 33.74 KG/M2 | HEART RATE: 83 BPM | SYSTOLIC BLOOD PRESSURE: 143 MMHG | WEIGHT: 215 LBS | RESPIRATION RATE: 16 BRPM | DIASTOLIC BLOOD PRESSURE: 75 MMHG | OXYGEN SATURATION: 98 %

## 2025-05-05 DIAGNOSIS — M25.462 SWELLING OF LEFT KNEE: ICD-10-CM

## 2025-05-05 DIAGNOSIS — Z79.899 HIGH RISK MEDICATION USE: ICD-10-CM

## 2025-05-05 DIAGNOSIS — G89.29 CHRONIC PAIN OF LEFT KNEE: Primary | ICD-10-CM

## 2025-05-05 DIAGNOSIS — M25.562 CHRONIC PAIN OF LEFT KNEE: Primary | ICD-10-CM

## 2025-05-05 PROCEDURE — 3288F FALL RISK ASSESSMENT DOCD: CPT | Mod: CPTII,,, | Performed by: INTERNAL MEDICINE

## 2025-05-05 PROCEDURE — 3008F BODY MASS INDEX DOCD: CPT | Mod: CPTII,,, | Performed by: INTERNAL MEDICINE

## 2025-05-05 PROCEDURE — 1125F AMNT PAIN NOTED PAIN PRSNT: CPT | Mod: CPTII,,, | Performed by: INTERNAL MEDICINE

## 2025-05-05 PROCEDURE — 1157F ADVNC CARE PLAN IN RCRD: CPT | Mod: CPTII,,, | Performed by: INTERNAL MEDICINE

## 2025-05-05 PROCEDURE — 4010F ACE/ARB THERAPY RXD/TAKEN: CPT | Mod: CPTII,,, | Performed by: INTERNAL MEDICINE

## 2025-05-05 PROCEDURE — 1159F MED LIST DOCD IN RCRD: CPT | Mod: CPTII,,, | Performed by: INTERNAL MEDICINE

## 2025-05-05 PROCEDURE — 99214 OFFICE O/P EST MOD 30 MIN: CPT | Mod: ,,, | Performed by: INTERNAL MEDICINE

## 2025-05-05 PROCEDURE — 3077F SYST BP >= 140 MM HG: CPT | Mod: CPTII,,, | Performed by: INTERNAL MEDICINE

## 2025-05-05 PROCEDURE — 1101F PT FALLS ASSESS-DOCD LE1/YR: CPT | Mod: CPTII,,, | Performed by: INTERNAL MEDICINE

## 2025-05-05 PROCEDURE — 3078F DIAST BP <80 MM HG: CPT | Mod: CPTII,,, | Performed by: INTERNAL MEDICINE

## 2025-05-05 PROCEDURE — 1160F RVW MEDS BY RX/DR IN RCRD: CPT | Mod: CPTII,,, | Performed by: INTERNAL MEDICINE

## 2025-05-05 RX ORDER — CELECOXIB 200 MG/1
200 CAPSULE ORAL DAILY
Qty: 30 CAPSULE | Refills: 2 | Status: SHIPPED | OUTPATIENT
Start: 2025-05-05

## 2025-05-05 NOTE — PROGRESS NOTES
Dayan Osullivan MD   [address] [place] [address]     Patient ID: [id number]     Chief Complaint: Knee Pain (Complain of left knee pain post stepping in a hole.)        HPI:     Eugene Gayle Jr. is a 69 y.o. male here today for a follow up on his left knee pain. It continues to bother him. It started when he stepped in a hole 2 weeks ago. He was having pain beside the knee. He went to [facility] and they thought it was a hamstring injury. He is working 5 days a week now. He has trouble getting restarted. He was swelling behind the knee. He would like an MRI to see what is going on. No other complaints today.       Subjective:     Review of Systems   Respiratory: Negative.     Cardiovascular:         Calling to get Costa to send labs too so he can do all at once at [facility]   Genitourinary:         Due PSA next month if I need lab he'd like it done then       Past Medical History:   Diagnosis Date    CAD (coronary artery disease)     Cataract of both eyes 05/17/2022    GERD (gastroesophageal reflux disease)     Hyperlipidemia     Hypertension     Obesity, unspecified     Orchitis and epididymitis 05/23/2022    ASHA on CPAP     PAD (peripheral artery disease) 08/19/2024    Partial thickness rotator cuff tear     Personal history of colonic polyps 10/24/2019    Mandi Hobbs MD  [facility]    Pneumonia, unspecified organism     Postprandial diarrhea     Prediabetes     Skin cancer     Tubular adenoma of colon 11/22/2022        Past Surgical History:   Procedure Laterality Date    APPENDECTOMY      BRONCHOSCOPY      CARDIAC CATHETERIZATION      COLONOSCOPY  10/24/2019    Mandi Hobbs MD  [facility]    COLONOSCOPY W/ BIOPSIES  11/16/2022    FINGER SURGERY      ROTATOR CUFF REPAIR  05/24/2018    SINUS SURGERY         Family History   Problem Relation Name Age of Onset    COPD Mother      Hypertension Mother      Heart disease Mother      Kidney failure Mother       "Hypertension Father      Lung cancer Father      Prostate cancer Father      Brain aneurysm Sister      COPD Brother      Aortic aneurysm Brother           from rupture age 50    Brain aneurysm Brother          Social History[1]    Review of patient's allergies indicates:   Allergen Reactions    Ciprofloxacin Itching and Rash       Outpatient Medications Marked as Taking for the 25 encounter (Office Visit) with Dayan Osullivan MD   Medication Sig Dispense Refill    acetaminophen (TYLENOL 8 HOUR ORAL) Take by mouth.      aspirin (ECOTRIN) 81 MG EC tablet Take 81 mg by mouth once daily.      cetirizine (ZYRTEC) 10 MG tablet Take 10 mg by mouth once daily.      CIALIS 20 mg Tab TAKE NO MORE THAN 1 TABLET BY MOUTH EVERY 48 HOURS      esomeprazole (NEXIUM) 20 MG capsule Take 20 mg by mouth before breakfast.      glucosamine-chondroitin 500-400 mg tablet Take 1 tablet by mouth 3 (three) times daily.      lisinopriL (PRINIVIL,ZESTRIL) 20 MG tablet Take 20 mg by mouth once daily.      lovastatin (MEVACOR) 40 MG tablet TAKE 1 TABLET EVERY NIGHT 90 tablet 3    magnesium oxide (MAG-OX) 400 mg (241.3 mg magnesium) tablet Take 400 mg by mouth once daily.      tamsulosin (FLOMAX) 0.4 mg Cp24       vitamin E 200 UNIT capsule Take 200 Units by mouth once daily.         Patient Care Team:  Dayan Osullivan MD as PCP - General (Internal Medicine)  Derrick Costa MD as Consulting Physician (Cardiology)  Mandi Hobbs III, MD as Consulting Physician (Gastroenterology)  Ronny Ross MD as Consulting Physician (Pulmonary Disease)  Boom Casper Jr., MD as Consulting Physician (Dermatology)  Peyton Marte PA-C (Dermatology)  Dane Polanco MD as Consulting Physician (Urology)  Stan Landry MD as Consulting Physician (Orthopedic Surgery)  Reji Coats DPM (Podiatry)       Objective:     BP (!) 143/75   Pulse 83   Temp 98.3 °F (36.8 °C) (Oral)   Resp 16   Ht 5' 7" (1.702 m)   Wt 97.5 kg " (215 lb)   SpO2 98%   BMI 33.67 kg/m²     Physical Exam  Vitals reviewed.   Musculoskeletal:         General: Swelling and tenderness present.      Comments: Swelling posterior left knee and medial, there is no redness or warmth, ROM is still good   Neurological:      Mental Status: He is alert.               Assessment/Plan:     1. Chronic pain of left knee  -     Ambulatory Referral/Consult to Physical Therapy  -     Cancel: Comprehensive Metabolic Panel; Future; Expected date: 05/19/2025  -     Comprehensive Metabolic Panel; Future; Expected date: 05/19/2025    2. Swelling of left knee  -     Ambulatory Referral/Consult to Physical Therapy  -     Cancel: Comprehensive Metabolic Panel; Future; Expected date: 05/19/2025  -     Comprehensive Metabolic Panel; Future; Expected date: 05/19/2025    3. High risk medication use  -     Cancel: Comprehensive Metabolic Panel; Future; Expected date: 05/19/2025  -     Comprehensive Metabolic Panel; Future; Expected date: 05/19/2025    Other orders  -     celecoxib (CELEBREX) 200 MG capsule; Take 1 capsule (200 mg total) by mouth once daily. Take with food  Dispense: 30 capsule; Refill: 2             Follow up in about 2 months (around 7/10/2025). In addition to their scheduled follow up, the patient has also been instructed to follow up on as needed basis.     Signature:  Dayan Osullivan MD  [facility]  [address] [place] [address]         [1]   Social History  Socioeconomic History    Marital status: Single   Tobacco Use    Smoking status: Former    Smokeless tobacco: Never   Substance and Sexual Activity    Alcohol use: Yes     Alcohol/week: 2.0 standard drinks of alcohol     Types: 2 Cans of beer per week    Drug use: No    Sexual activity: Yes     Social Drivers of Health     Financial Resource Strain: Low Risk  (5/5/2025)    Overall Financial Resource Strain (CARDIA)     Difficulty of Paying Living Expenses: Not hard at all   Food Insecurity: No Food  Insecurity (5/5/2025)    Hunger Vital Sign     Worried About Running Out of Food in the Last Year: Never true     Ran Out of Food in the Last Year: Never true   Transportation Needs: No Transportation Needs (5/5/2025)    PRAPARE - Transportation     Lack of Transportation (Medical): No     Lack of Transportation (Non-Medical): No   Physical Activity: Inactive (5/5/2025)    Exercise Vital Sign     Days of Exercise per Week: 0 days     Minutes of Exercise per Session: 0 min   Stress: No Stress Concern Present (5/5/2025)    [employer] [place] [employer] - Occupational Stress Questionnaire     Feeling of Stress : Not at all   Housing Stability: Low Risk  (5/5/2025)    Housing Stability Vital Sign     Unable to Pay for Housing in the Last Year: No     Number of Times Moved in the Last Year: 0     Homeless in the Last Year: No

## 2025-05-09 ENCOUNTER — CLINICAL SUPPORT (OUTPATIENT)
Dept: REHABILITATION | Facility: HOSPITAL | Age: 70
End: 2025-05-09
Attending: INTERNAL MEDICINE
Payer: MEDICARE

## 2025-05-09 DIAGNOSIS — M25.662 DECREASED ROM OF LEFT KNEE: ICD-10-CM

## 2025-05-09 DIAGNOSIS — R26.89 DECREASED FUNCTIONAL MOBILITY: ICD-10-CM

## 2025-05-09 DIAGNOSIS — M25.462 SWELLING OF LEFT KNEE JOINT: ICD-10-CM

## 2025-05-09 DIAGNOSIS — R29.898 WEAKNESS OF BOTH HIPS: ICD-10-CM

## 2025-05-09 DIAGNOSIS — M25.562 LEFT KNEE PAIN, UNSPECIFIED CHRONICITY: Primary | ICD-10-CM

## 2025-05-09 PROCEDURE — 97162 PT EVAL MOD COMPLEX 30 MIN: CPT

## 2025-05-09 NOTE — PROGRESS NOTES
Outpatient Rehab    Physical Therapy Evaluation (only)    Patient Name: Jamal Gayle Jr.  MRN: [id number]  YOB: 1955  Encounter Date: 5/9/2025    Therapy Diagnosis:   Encounter Diagnoses   Name Primary?    Left knee pain, unspecified chronicity Yes    Swelling of left knee joint     Decreased functional mobility     Decreased ROM of left knee     Weakness of both hips      Physician: Dayan Osullivan MD    Physician Orders: Eval and Treat  Medical Diagnosis: Chronic pain of left knee  Swelling of left knee    Visit # / Visits Authorized:  1 / 1  Insurance Authorization Period: 5/5/2025 to 5/5/2026  Date of Evaluation: 5/9/2025  Plan of Care Certification: 5/9/2025 to TBD (asked for 2 wk 6)     Time In: 0851   Time Out: 0922  Total Time (in minutes): 31       Intake Outcome Measure for FOTO Survey    Therapist reviewed FOTO scores for Jamal Gayle Jr. on 5/9/2025.   FOTO report - see Media section or FOTO account episode details.     Intake Score: 64%         Subjective   History of Present Illness  Jamal is a 69 y.o. male who reports to physical therapy with a chief concern of L knee and posterior thigh pain with lifting and sitting for long periods. The pt states this pain began after he stepped in a hole getting out of his truck while doing yardwork..     The patient reports a medical diagnosis of M25.562 (Chronic (L) knee pain) M25.462 (Swelling of (L) knee). The patient has experienced this issue since 05/09/25.   Diagnostic tests related to this condition: X-ray.   X-Ray Details: 01/03/24: degenerative changes with tricompartmental osteophytes. No displaced fracture. Joint spaces relatively well maintained. No effusion.    Dominant Hand: Right  History of Present Condition/Illness: The pt is a 69 year old male who reports to PT with a c/c of L knee and posterior thigh pain. He states this pain began after he stepped out of his truck in a hole with his LLE while doing yard work. He does state  he has been dealing with L knee pain off/on throughout the years, even having injections to this area. He states this incident in the yard was about a month ago, and now his pain is increased with lifting and prolonged sedentary positions. He lives alone in a mobile home with a ramp to enter his home. He works at [employer] currently.     Activities of Daily Living  Social history was obtained from Patient.          Patient Roles: Caregiver for adult  Patient Responsibilities: Community mobility, Driving, Home management, Health management, Financial management, Laundry, Meal prep, Personal ADL, Yard work    Previously independent with activities of daily living? Yes     Currently independent with activities of daily living? Yes          Previously independent with instrumental activities of daily living? Yes     Currently independent with instrumental activities of daily living? Yes              Pain     Patient reports a current pain level of 4/10. Pain at best is reported as 3/10. Pain at worst is reported as 10/10.   Location: L knee and posterior thigh  Clinical Progression (since onset): Unchanged  Pain Qualities: Discomfort, Aching, Sharp, Tenderness, Tightness  Pain-Relieving Factors: Change in position, Movement  Pain-Aggravating Factors: Lifting, Sitting         Living Arrangements  Living Situation  Housing: Home independently  Living Arrangements: Alone  Support Systems: Friends/neighbors    Home Setup  Type of Structure: Mobile home  Home Access: Ramped entrance  Number of Levels in Home: One level  Existing Accessibility Options: Ramp  Patient is able to live on main floor of home: Yes  Primary Bedroom: 1st floor  Primary Bathroom: 1st floor  Bathroom Toilet: Standard  Kitchen: 1st floor  Laundry: 1st floor        Employment  Patient reports: Does the patient's condition impact their ability to work?  Employment Status: Employed part-time   [employer]      Past Medical History/Physical Systems Review:    Eugene Gayle Jr.  has a past medical history of CAD (coronary artery disease), Cataract of both eyes, GERD (gastroesophageal reflux disease), Hyperlipidemia, Hypertension, Obesity, unspecified, Orchitis and epididymitis, ASHA on CPAP, PAD (peripheral artery disease), Partial thickness rotator cuff tear, Personal history of colonic polyps, Pneumonia, unspecified organism, Postprandial diarrhea, Prediabetes, Skin cancer, and Tubular adenoma of colon.    Eugene Gayle Jr.  has a past surgical history that includes Colonoscopy (10/24/2019); Rotator cuff repair (05/24/2018); Appendectomy; Bronchoscopy; Sinus surgery; Cardiac catheterization; Finger surgery; and Colonoscopy w/ biopsies (11/16/2022).    Eugene has a current medication list which includes the following prescription(s): acetaminophen, aspirin, celecoxib, cetirizine, cialis, esomeprazole, glucosamine-chondroitin, lisinopril, lovastatin, magnesium oxide, tamsulosin, and vitamin e.    Review of patient's allergies indicates:   Allergen Reactions    Ciprofloxacin Itching and Rash        Objective   Posture                 The pt has mild-mod fwd head posture and rounded shoulders. He does have slight anterior pelvic tilt in standing.    Knee Palpation  Right Knee Palpation  Unremarkable: Muscle, Bony Prominence/Bursa, and Tendon/Ligament          Left Knee Palpation  Abnormal: Muscle and Tendon/Ligament  Left Knee Muscle Palpation Observations: TTP of mm belly and distal portion of medial HS and adductors  Left Knee Tendon/Ligament Palpation Observations: Pes anserine insertion area  Superior Patella: 40.5 cm,Mid Patella: 40 cm, Inferior Patella: 36.5 cm; Mid Calf: 38.75 cm         Hip Range of Motion   Right Hip   Active (deg) Passive (deg) Pain   Flexion   120     Extension         ABduction         ADduction         External Rotation 90/90         External Rotation Prone         Internal Rotation 90/90         Internal Rotation Prone              Left Hip   Active (deg) Passive (deg) Pain   Flexion   100 Yes   Extension         ABduction         ADduction         External Rotation 90/90         External Rotation Prone         Internal Rotation 90/90         Internal Rotation Prone             Hip IR PROM: R=25% of WFL and L=50% of WFL; Hip ER PROM: R= WFL and L= 75% of WFL    Knee Range of Motion   Right Knee   Active (deg) Passive (deg) Pain   Flexion 130       Extension 0           Left Knee   Active (deg) Passive (deg) Pain   Flexion 110   Yes   Extension 0                          Hip Strength - Planes of Motion   Right Strength Right Pain Left Strength Left  Pain   Flexion (L2) 4   4     Extension 4   3+     ABduction 4   4     ADduction 4   3+     Internal Rotation           External Rotation 4+   4+         Knee Strength   Right Strength Right Pain Left Strength Left  Pain   Flexion (S2) 4+   4     Prone Flexion     3+ Yes   Extension (L3) 4+   4                Hip Joint Mobility  HF and ITB tightness (mod) on L; (B) HS tightness (L>R) with Sciatic NTT + on L        Transfers Assessment  Sit to Stand Assistance: Independent  Chair to Bed Assistance: Independent  Bed to Chair Assistance: Independent    Bed Mobility Assessment  Rolling Assistance: Independent  Sidelying to Sit Assistance: Independent  Sit to Sidelying Assistance: Independent  Scooting to Edge of Bed Assistance: Independent  Bridge/Boost to Head of Bed Assistance: Independent      Fall Risk  Functional mobility test results suggest the patient is not: At Risk for Falls  Timed Up & Go (TUG)  Time: 12 seconds  Observations: Short strides and Little or no arm swing  An older adult who takes >=12 seconds to complete the TUG is at risk for falling.          Ambulation Assistance Required  Surface With  Assistive Device Without Assistive Device Details   Level   Independent      Uneven         Curb           Gait Analysis  Base of Support: Narrow  Walking Speed: Decreased    Right Side  "Walking Observations  Increased: Stance Time  Decreased: Swing Time, Step Length, and Arm Swing  Right Foot Contact Pattern: Heel to toe    Left Side Walking Observations  Increased: Swing Time  Decreased: Stance Time, Step Length, and Arm Swing  Left Foot Contact Pattern: Flat foot         Time Entry(in minutes):  PT Evaluation (Moderate) Time Entry: 30    Assessment & Plan   Assessment  Jamal presents with a condition of Moderate complexity.   Presentation of Symptoms: Evolving  Will Comorbidities Impact Care: Yes  BMI and chronic L knee pain    Functional Limitations: Activity tolerance, Ambulating on uneven surfaces, Decreased ambulation distance/endurance, Completing work/school activities, Gait limitations, Functional mobility, Maintaining balance, Range of motion, Squatting, Standing tolerance, Sitting tolerance, Pain with ADLs/IADLs, Painful locomotion/ambulation, Participating in leisure activities  Impairments: Activity intolerance, Abnormal or restricted range of motion, Impaired balance, Pain with functional activity, Lack of appropriate home exercise program, Impaired physical strength, Abnormal gait, Abnormal muscle tone  Personal Factors Affecting Prognosis: Pain    Patient Goal for Therapy (PT): "I want to be able to lift items with no knee pain."  Prognosis: Fair  Prognosis Details: The pt is 69 years of age, has battled with chronic knee pain, and has an acute injury.  Assessment Details: The pt presents with L knee ROM deficits, L knee effusion, LE strength deficits, gait/balance deficits, muscular tightness, and myofascial pain that can be addressed by skilled PT interventions.     Plan  From a physical therapy perspective, the patient would benefit from: Skilled Rehab Services    Planned therapy interventions include: Therapeutic exercise, Therapeutic activities, and Neuromuscular re-education.            Visit Frequency: 2 times Per Week for 6 Weeks.       This plan was discussed with Patient.   " Discussion participants: Agreed Upon Plan of Care  Plan details: The pt would benefit from skilled PT interventions at a frequency of 2x a week for 6 weeks with a focus on improving L knee ROM, LE strength, gait/balance, flexibility, and pain reports of L knee.          Patient's spiritual, cultural, and educational needs considered and patient agreeable to plan of care and goals.           Goals:   Active       Functional outcome       Patient will show a significant change in FOTO patient-reported outcome tool to demonstrate subjective improvement (Progressing)       Start:  05/09/25    Expected End:  07/04/25               Pain       Patient will report L knee pain at worst of 1/10 demonstrating a reduction of overall pain (Progressing)       Start:  05/09/25    Expected End:  07/04/25               Range of Motion       Patient will achieve left knee ROM of  0-130 degrees  (Progressing)       Start:  05/09/25    Expected End:  07/04/25               Strength       Patient will achieve bilateral hip extension strength of 4+/5 (Progressing)       Start:  05/09/25    Expected End:  07/04/25            Patient will achieve left knee flexion strength of 4+/5 (Progressing)       Start:  05/09/25    Expected End:  07/04/25                Zain Mendez PT

## 2025-05-14 ENCOUNTER — CLINICAL SUPPORT (OUTPATIENT)
Dept: REHABILITATION | Facility: HOSPITAL | Age: 70
End: 2025-05-14
Attending: INTERNAL MEDICINE
Payer: MEDICARE

## 2025-05-14 DIAGNOSIS — M25.562 LEFT KNEE PAIN, UNSPECIFIED CHRONICITY: Primary | ICD-10-CM

## 2025-05-14 DIAGNOSIS — M25.662 DECREASED ROM OF LEFT KNEE: ICD-10-CM

## 2025-05-14 DIAGNOSIS — R26.89 DECREASED FUNCTIONAL MOBILITY: ICD-10-CM

## 2025-05-14 DIAGNOSIS — R29.898 WEAKNESS OF BOTH HIPS: ICD-10-CM

## 2025-05-14 DIAGNOSIS — M25.462 SWELLING OF LEFT KNEE JOINT: ICD-10-CM

## 2025-05-14 PROCEDURE — 97110 THERAPEUTIC EXERCISES: CPT | Mod: CQ

## 2025-05-15 NOTE — PROGRESS NOTES
Outpatient Rehab    Physical Therapy Visit    Patient Name: Jamal Gayle Jr.  MRN: [id number]  YOB: 1955  Encounter Date: 5/14/2025    Therapy Diagnosis:   Encounter Diagnoses   Name Primary?    Left knee pain, unspecified chronicity Yes    Swelling of left knee joint     Decreased functional mobility     Decreased ROM of left knee     Weakness of both hips      Physician: Dayan Osullivan MD    Physician Orders: Eval and Treat  Medical Diagnosis: Pain in left knee  Effusion, left knee    Visit # / Visits Authorized:  1 / 12  Insurance Authorization Period: 5/12/2025 to 7/4/2025  Date of Evaluation: 5/9/2025  Plan of Care Certification: 5/9/2025 to 7/4/2025      PT/PTA: PTA   Number of PTA visits since last PT visit:1  Time In: 1500   Time Out: 1545  Total Time (in minutes): 45   Total Billable Time (in minutes): 45    FOTO:  Intake Score:  %  Survey Score 2:  %  Survey Score 3:  %    Precautions:       Subjective   Patient reported he does not have that much pain today but it does increase at the end of the day and whenever he's sitting or have to pick any object up. He does have some left inferior knee pain but the hamstring gives him more trouble..  Pain reported as 2/10. left hamstring region    Objective            Treatment:  Therapeutic Exercise  TE 1: left SLR with quad set  TE 2: B prone hip ext and HS curl  Manual Therapy  MT 1: B hamstring stretch  MT 2: Left HS sciatic nerve glides  Balance/Neuromuscular Re-Education  NMR 1: glute re-ed: bridges with add and abd with TB  NMR 2: quad re-ed: left prone quad set  NMR 3: hamstring re-ed: DKTC with HS set    Time Entry(in minutes):  Manual Therapy Time Entry: 10  Neuromuscular Re-Education Time Entry: 15  Therapeutic Exercise Time Entry: 20    Assessment & Plan   Assessment: Patient demonstrated a (+) outcome after the initiation of PT plan of care today as evidenced by no report of additional pain. He required minimal to moderate verbal and  tactile cues to maintain proper form in order to contract specific muscles. Good left quad contraction noted during strengthening exercises. Minimal inhibitory cues required due to overcompensation of left quadriceps. No tenderness upon palpation noted. Advised patient to perform gentle left HS stretches at home. Recommend patient continue with PT poc to address deficits.  Evaluation/Treatment Tolerance: Patient tolerated treatment well    Patient will continue to benefit from skilled outpatient physical therapy to address the deficits listed in the problem list box on initial evaluation, provide pt/family education and to maximize pt's level of independence in the home and community environment.     Patient's spiritual, cultural, and educational needs considered and patient agreeable to plan of care and goals.     Education  Education was done with Patient. The patient's learning style includes Listening. The patient Verbalizes understanding.         Educated patient to continue HEP to address deficits mentioned.        Plan: Continue PT plan of care.    Goals:   Active       Functional outcome       Patient will show a significant change in FOTO patient-reported outcome tool to demonstrate subjective improvement (Progressing)       Start:  05/09/25    Expected End:  07/04/25               Pain       Patient will report L knee pain at worst of 1/10 demonstrating a reduction of overall pain (Progressing)       Start:  05/09/25    Expected End:  07/04/25               Range of Motion       Patient will achieve left knee ROM of  0-130 degrees  (Progressing)       Start:  05/09/25    Expected End:  07/04/25               Strength       Patient will achieve bilateral hip extension strength of 4+/5 (Progressing)       Start:  05/09/25    Expected End:  07/04/25            Patient will achieve left knee flexion strength of 4+/5 (Progressing)       Start:  05/09/25    Expected End:  07/04/25                Anna Marie Mehta  PTA

## 2025-05-16 ENCOUNTER — CLINICAL SUPPORT (OUTPATIENT)
Dept: REHABILITATION | Facility: HOSPITAL | Age: 70
End: 2025-05-16
Attending: INTERNAL MEDICINE
Payer: MEDICARE

## 2025-05-16 DIAGNOSIS — R26.89 DECREASED FUNCTIONAL MOBILITY: ICD-10-CM

## 2025-05-16 DIAGNOSIS — M25.562 LEFT KNEE PAIN, UNSPECIFIED CHRONICITY: Primary | ICD-10-CM

## 2025-05-16 DIAGNOSIS — M25.462 SWELLING OF LEFT KNEE JOINT: ICD-10-CM

## 2025-05-16 DIAGNOSIS — R29.898 WEAKNESS OF BOTH HIPS: ICD-10-CM

## 2025-05-16 DIAGNOSIS — M25.662 DECREASED ROM OF LEFT KNEE: ICD-10-CM

## 2025-05-16 PROCEDURE — 97110 THERAPEUTIC EXERCISES: CPT

## 2025-05-16 NOTE — PROGRESS NOTES
Outpatient Rehab    Physical Therapy Visit    Patient Name: Jamal Gayle Jr.  MRN: [id number]  YOB: 1955  Encounter Date: 5/16/2025    Therapy Diagnosis:   Encounter Diagnoses   Name Primary?    Left knee pain, unspecified chronicity Yes    Swelling of left knee joint     Decreased functional mobility     Decreased ROM of left knee     Weakness of both hips      Physician: Dayan Osullivan MD    Physician Orders: Eval and Treat  Medical Diagnosis: Pain in left knee  Effusion, left knee    Visit # / Visits Authorized:  2 / 12  Insurance Authorization Period: 5/12/2025 to 7/4/2025  Date of Evaluation: 5/9/2025  Plan of Care Certification: 5/9/2025 to 7/4/2025      PT/PTA: PT   Number of PTA visits since last PT visit:1  Time In: 1500   Time Out: 1545  Total Time (in minutes): 45   Total Billable Time (in minutes):      FOTO:  Intake Score:  %  Survey Score 2:  %  Survey Score 3:  %    Precautions:       Subjective   his Left knee is feeling better already since he started therapy. He only takes Tylenol in case he has pain because scared to take anything else.  Pain reported as 3/10. left posterior and medial knee    Objective            Treatment:  Therapeutic Exercise  TE 1: warm-up: NuStep LE only (see exercise sheet)  TE 2: L LE stretches: hamstring, quadriceps, gastroc (see exercise sheet)  TE 3: L LE strengthening exercises on mat with resistance (see exercise sheet)  TE 4: L LE strengthening exercises sitting with resistance (see exercise sheet)  TE 5: L LE strengthening exercises standing with resistance (see exercise sheet)  Balance/Neuromuscular Re-Education  NMR 1: left single leg stance (see exercise sheet)    Time Entry(in minutes):  Therapeutic Exercise Time Entry: 45    Assessment & Plan   Assessment: Pt performed cardiovascular endurance exercise, L LE stretches, and L LE strengthening exercises with good effort with cues required for proper technique to isolate specific muscles.  Discussed importance of performing HEP daily outside of therapy for optimal improvements. written HEP and theraband issued and discussed  Evaluation/Treatment Tolerance: Patient tolerated treatment well    Patient will continue to benefit from skilled outpatient physical therapy to address the deficits listed in the problem list box on initial evaluation, provide pt/family education and to maximize pt's level of independence in the home and community environment.     Patient's spiritual, cultural, and educational needs considered and patient agreeable to plan of care and goals.             Plan: continue with current POC, progress per pt's tolerance, and reassess pt at later date to determine need for additional therapy    Goals:   Active       Functional outcome       Patient will show a significant change in FOTO patient-reported outcome tool to demonstrate subjective improvement (Progressing)       Start:  05/09/25    Expected End:  07/04/25               Pain       Patient will report L knee pain at worst of 1/10 demonstrating a reduction of overall pain (Progressing)       Start:  05/09/25    Expected End:  07/04/25               Range of Motion       Patient will achieve left knee ROM of  0-130 degrees  (Progressing)       Start:  05/09/25    Expected End:  07/04/25               Strength       Patient will achieve bilateral hip extension strength of 4+/5 (Progressing)       Start:  05/09/25    Expected End:  07/04/25            Patient will achieve left knee flexion strength of 4+/5 (Progressing)       Start:  05/09/25    Expected End:  07/04/25                Zehra Coelho, PT

## 2025-05-20 ENCOUNTER — CLINICAL SUPPORT (OUTPATIENT)
Dept: REHABILITATION | Facility: HOSPITAL | Age: 70
End: 2025-05-20
Attending: INTERNAL MEDICINE
Payer: MEDICARE

## 2025-05-20 DIAGNOSIS — R29.898 WEAKNESS OF BOTH HIPS: ICD-10-CM

## 2025-05-20 DIAGNOSIS — M25.562 LEFT KNEE PAIN, UNSPECIFIED CHRONICITY: Primary | ICD-10-CM

## 2025-05-20 DIAGNOSIS — M25.462 SWELLING OF LEFT KNEE JOINT: ICD-10-CM

## 2025-05-20 DIAGNOSIS — M25.662 DECREASED ROM OF LEFT KNEE: ICD-10-CM

## 2025-05-20 DIAGNOSIS — R26.89 DECREASED FUNCTIONAL MOBILITY: ICD-10-CM

## 2025-05-20 PROCEDURE — 97110 THERAPEUTIC EXERCISES: CPT

## 2025-05-20 PROCEDURE — 97140 MANUAL THERAPY 1/> REGIONS: CPT

## 2025-05-20 PROCEDURE — 97112 NEUROMUSCULAR REEDUCATION: CPT

## 2025-05-20 NOTE — PROGRESS NOTES
Outpatient Rehab    Physical Therapy Visit    Patient Name: Jamal Gayle Jr.  MRN: [id number]  YOB: 1955  Encounter Date: 5/20/2025    Therapy Diagnosis:   Encounter Diagnoses   Name Primary?    Left knee pain, unspecified chronicity Yes    Swelling of left knee joint     Decreased functional mobility     Decreased ROM of left knee     Weakness of both hips      Physician: Dayan Osullivan MD    Physician Orders: Eval and Treat  Medical Diagnosis: Pain in left knee  Effusion, left knee    Visit # / Visits Authorized:  3 / 12  Insurance Authorization Period: 5/12/2025 to 7/4/2025  Date of Evaluation: 5/9/2025  Plan of Care Certification: 5/9/2025 to 7/4/2025      PT/PTA: PT   Number of PTA visits since last PT visit:0  Time In: 1415   Time Out: 1500  Total Time (in minutes): 45     Subjective   The pt states he has been hurting today, but has been busy at work lately. He states he has been completing his HEP..  Pain reported as 6/10. left posterior and medial knee    Objective            Treatment:  Therapeutic Exercise  TE 1: hip strengthening with SL hip abd and add  TE 2: balance and LE strength/stability with standing hip flexion, abd, and ext (B)  TE 3: Modified Golfer's lifts (5#) for SL stability  Manual Therapy  MT 1: (B) hip add and piri stretch  MT 2: Left sciatic nerve glides  MT 3: prone quad and HF stretch  MT 4: (L) tibial mobs  Balance/Neuromuscular Re-Education  NMR 1: glute re-ed with bridges and prone hip extension  NMR 2: HS strength re-ed with prone HS curl    Time Entry(in minutes):  Manual Therapy Time Entry: 12  Neuromuscular Re-Education Time Entry: 15  Therapeutic Exercise Time Entry: 15    Assessment & Plan   Assessment: The pt required slight PT verbal and tactile cueing in order to complete prone hip ext and SL hip abd interventions correctly. During standing hip flex, abd, and extension he had more trunk compensations observed while in stance phase on RLE as compared to  L. Modified golfer's lifts required PT tactile cueing to trunk in order to decrease trunk flexion.       Patient will continue to benefit from skilled outpatient physical therapy to address the deficits listed in the problem list box on initial evaluation, provide pt/family education and to maximize pt's level of independence in the home and community environment.     Patient's spiritual, cultural, and educational needs considered and patient agreeable to plan of care and goals.           Plan: Ct with current PT POC.    Goals:   Active       Functional outcome       Patient will show a significant change in FOTO patient-reported outcome tool to demonstrate subjective improvement (Progressing)       Start:  05/09/25    Expected End:  07/04/25               Pain       Patient will report L knee pain at worst of 1/10 demonstrating a reduction of overall pain (Progressing)       Start:  05/09/25    Expected End:  07/04/25               Range of Motion       Patient will achieve left knee ROM of  0-130 degrees  (Progressing)       Start:  05/09/25    Expected End:  07/04/25               Strength       Patient will achieve bilateral hip extension strength of 4+/5 (Progressing)       Start:  05/09/25    Expected End:  07/04/25            Patient will achieve left knee flexion strength of 4+/5 (Progressing)       Start:  05/09/25    Expected End:  07/04/25                Zain Mendez, PT

## 2025-05-27 ENCOUNTER — CLINICAL SUPPORT (OUTPATIENT)
Dept: REHABILITATION | Facility: HOSPITAL | Age: 70
End: 2025-05-27
Attending: INTERNAL MEDICINE
Payer: MEDICARE

## 2025-05-27 DIAGNOSIS — M25.562 LEFT KNEE PAIN, UNSPECIFIED CHRONICITY: Primary | ICD-10-CM

## 2025-05-27 DIAGNOSIS — M25.662 DECREASED ROM OF LEFT KNEE: ICD-10-CM

## 2025-05-27 DIAGNOSIS — R26.89 DECREASED FUNCTIONAL MOBILITY: ICD-10-CM

## 2025-05-27 DIAGNOSIS — R29.898 WEAKNESS OF BOTH HIPS: ICD-10-CM

## 2025-05-27 DIAGNOSIS — M25.462 SWELLING OF LEFT KNEE JOINT: ICD-10-CM

## 2025-05-27 PROCEDURE — 97140 MANUAL THERAPY 1/> REGIONS: CPT | Mod: CQ

## 2025-05-27 PROCEDURE — 97110 THERAPEUTIC EXERCISES: CPT | Mod: CQ

## 2025-05-27 PROCEDURE — 97112 NEUROMUSCULAR REEDUCATION: CPT | Mod: CQ

## 2025-05-27 NOTE — PROGRESS NOTES
Outpatient Rehab    Physical Therapy Visit    Patient Name: Jamal Gayle Jr.  MRN: [id number]  YOB: 1955  Encounter Date: 5/27/2025    Therapy Diagnosis:   Encounter Diagnoses   Name Primary?    Left knee pain, unspecified chronicity Yes    Swelling of left knee joint     Decreased functional mobility     Decreased ROM of left knee     Weakness of both hips      Physician: Dayan Osullivan MD    Physician Orders: Eval and Treat  Medical Diagnosis: Pain in left knee  Effusion, left knee    Visit # / Visits Authorized:  4 / 12  Insurance Authorization Period: 5/12/2025 to 7/4/2025  Date of Evaluation: 5/9/2025  Plan of Care Certification: 5/9/2025 to 7/4/2025      PT/PTA: PTA   Number of PTA visits since last PT visit:2  Time In: 0800   Time Out: 0845  Total Time (in minutes): 45   Total Billable Time (in minutes): 45    FOTO:  Intake Score:  %  Survey Score 2:  %  Survey Score 3:  %    Precautions:       Subjective   Patient states he is doing much better and does not have any pain in the left knee or behind the leg..  Pain reported as 0/10. left knee and hamstring    Objective            Treatment:       Time Entry(in minutes):  Manual Therapy Time Entry: 10  Neuromuscular Re-Education Time Entry: 15  Therapeutic Exercise Time Entry: 20    Assessment & Plan   Assessment: Patient demonstrated a better tolerance to strengthening program today as evidenced by ability to perform increased repetitions. However he began to report increased cramping sensation in the left hamstring during hamstring curls in prone position and was unable to complete remaining repetitions. Increased tightness and muscular tone noted in the medial hamstring region and was addressed with soft tissue mobilization and passive stretching. Patient was able to complete remaining exercises within intervention with good effort. He is progressing well toward all goals including reduced pain level. Recommend he continue with PT plan of  care to address deficits above.  Evaluation/Treatment Tolerance: Patient tolerated treatment well    The patient will continue to benefit from skilled outpatient physical therapy in order to address the deficits listed in the problem list on the initial evaluation, provide patient and family education, and maximize the patients level of independence in the home and community environments.     The patient's spiritual, cultural, and educational needs were considered, and the patient is agreeable to the plan of care and goals.     Education  Education was done with Patient. The patient's learning style includes Listening. The patient Verbalizes understanding.         Educated patient to continue HEP to address deficits mentioned.        Plan: Ct with current PT POC.    Goals:   Active       Functional outcome       Patient will show a significant change in FOTO patient-reported outcome tool to demonstrate subjective improvement (Progressing)       Start:  05/09/25    Expected End:  07/04/25               Pain       Patient will report L knee pain at worst of 1/10 demonstrating a reduction of overall pain (Progressing)       Start:  05/09/25    Expected End:  07/04/25               Range of Motion       Patient will achieve left knee ROM of  0-130 degrees  (Progressing)       Start:  05/09/25    Expected End:  07/04/25               Strength       Patient will achieve bilateral hip extension strength of 4+/5 (Progressing)       Start:  05/09/25    Expected End:  07/04/25            Patient will achieve left knee flexion strength of 4+/5 (Progressing)       Start:  05/09/25    Expected End:  07/04/25                Anna Marie Mehta PTA

## 2025-05-29 ENCOUNTER — CLINICAL SUPPORT (OUTPATIENT)
Dept: REHABILITATION | Facility: HOSPITAL | Age: 70
End: 2025-05-29
Attending: INTERNAL MEDICINE
Payer: MEDICARE

## 2025-05-29 DIAGNOSIS — M25.562 LEFT KNEE PAIN, UNSPECIFIED CHRONICITY: Primary | ICD-10-CM

## 2025-05-29 DIAGNOSIS — M25.462 SWELLING OF LEFT KNEE JOINT: ICD-10-CM

## 2025-05-29 DIAGNOSIS — R26.89 DECREASED FUNCTIONAL MOBILITY: ICD-10-CM

## 2025-05-29 DIAGNOSIS — M25.662 DECREASED ROM OF LEFT KNEE: ICD-10-CM

## 2025-05-29 DIAGNOSIS — R29.898 WEAKNESS OF BOTH HIPS: ICD-10-CM

## 2025-05-29 PROCEDURE — 97140 MANUAL THERAPY 1/> REGIONS: CPT | Mod: CQ

## 2025-05-29 PROCEDURE — 97110 THERAPEUTIC EXERCISES: CPT | Mod: CQ

## 2025-05-29 PROCEDURE — 97112 NEUROMUSCULAR REEDUCATION: CPT | Mod: CQ

## 2025-05-29 NOTE — PROGRESS NOTES
Outpatient Rehab    Physical Therapy Visit    Patient Name: Jamal Gayle Jr.  MRN: [id number]  YOB: 1955  Encounter Date: 5/29/2025    Therapy Diagnosis:   Encounter Diagnoses   Name Primary?    Left knee pain, unspecified chronicity Yes    Swelling of left knee joint     Decreased functional mobility     Decreased ROM of left knee     Weakness of both hips      Physician: Dayan Osullivan MD    Physician Orders: Eval and Treat  Medical Diagnosis: Pain in left knee  Effusion, left knee    Visit # / Visits Authorized:  5 / 12  Insurance Authorization Period: 5/12/2025 to 7/4/2025  Date of Evaluation: 5/9/2025  Plan of Care Certification: 5/9/2025 to 7/4/2025      PT/PTA: PTA   Number of PTA visits since last PT visit:2  Time In: 0800   Time Out: 0845  Total Time (in minutes): 45   Total Billable Time (in minutes): 45    FOTO:  Intake Score:  %  Survey Score 2:  %  Survey Score 3:  %    Precautions:       Subjective   Patient reports improving pain in L knee. Continues to feel increased pain in the posterior knee when lifting objects..  Pain reported as 0/10. left knee and hamstring    Objective            Treatment:  Therapeutic Exercise  TE 1: hip strengthening with SL hip abd and add  TE 2: balance and LE strength/stability with standing hip flexion, abd, and ext (B)  TE 3: Modified Golfer's lifts (5#) for SL stability  TE 4: L LE strengthening exercises sitting with resistance (see exercise sheet)  TE 5: L LE strengthening exercises standing with resistance (see exercise sheet)  Manual Therapy  MT 1: (B) hamstring, hip add, piri stretch  MT 2: Left sciatic nerve glides  MT 3: prone quad and HF stretch  MT 4: (L) tibial mobs  Balance/Neuromuscular Re-Education  NMR 1: glute re-ed with bridges and prone hip extension  NMR 2: HS strength re-ed with prone HS curl    Time Entry(in minutes):  Manual Therapy Time Entry: 10  Neuromuscular Re-Education Time Entry: 15  Therapeutic Exercise Time Entry:  "20    Assessment & Plan   Assessment: Patient demonstrated a positive outcome after session today, as evidenced by ability to complete all exercises with good effort and noted relief after session. Moderate verbal cues required for form. Patient noted some "pulling" in the posterior L knee with the last few reps of step-ups, but was tolerable. Good relief noted after manual work.  Evaluation/Treatment Tolerance: Patient tolerated treatment well    The patient will continue to benefit from skilled outpatient physical therapy in order to address the deficits listed in the problem list on the initial evaluation, provide patient and family education, and maximize the patients level of independence in the home and community environments.     The patient's spiritual, cultural, and educational needs were considered, and the patient is agreeable to the plan of care and goals.           Plan: Ct with current PT POC.    Goals:   Active       Functional outcome       Patient will show a significant change in FOTO patient-reported outcome tool to demonstrate subjective improvement (Progressing)       Start:  05/09/25    Expected End:  07/04/25               Pain       Patient will report L knee pain at worst of 1/10 demonstrating a reduction of overall pain (Progressing)       Start:  05/09/25    Expected End:  07/04/25               Range of Motion       Patient will achieve left knee ROM of  0-130 degrees  (Progressing)       Start:  05/09/25    Expected End:  07/04/25               Strength       Patient will achieve bilateral hip extension strength of 4+/5 (Progressing)       Start:  05/09/25    Expected End:  07/04/25            Patient will achieve left knee flexion strength of 4+/5 (Progressing)       Start:  05/09/25    Expected End:  07/04/25                Cruz Reynolds PTA    "

## 2025-06-03 ENCOUNTER — CLINICAL SUPPORT (OUTPATIENT)
Dept: REHABILITATION | Facility: HOSPITAL | Age: 70
End: 2025-06-03
Attending: INTERNAL MEDICINE
Payer: MEDICARE

## 2025-06-03 DIAGNOSIS — R29.898 WEAKNESS OF BOTH HIPS: ICD-10-CM

## 2025-06-03 DIAGNOSIS — M25.462 SWELLING OF LEFT KNEE JOINT: ICD-10-CM

## 2025-06-03 DIAGNOSIS — M25.662 DECREASED ROM OF LEFT KNEE: ICD-10-CM

## 2025-06-03 DIAGNOSIS — R26.89 DECREASED FUNCTIONAL MOBILITY: ICD-10-CM

## 2025-06-03 DIAGNOSIS — M25.562 LEFT KNEE PAIN, UNSPECIFIED CHRONICITY: Primary | ICD-10-CM

## 2025-06-03 PROCEDURE — 97112 NEUROMUSCULAR REEDUCATION: CPT | Mod: CQ

## 2025-06-03 PROCEDURE — 97110 THERAPEUTIC EXERCISES: CPT | Mod: CQ

## 2025-06-03 PROCEDURE — 97140 MANUAL THERAPY 1/> REGIONS: CPT | Mod: CQ

## 2025-06-05 ENCOUNTER — CLINICAL SUPPORT (OUTPATIENT)
Dept: REHABILITATION | Facility: HOSPITAL | Age: 70
End: 2025-06-05
Attending: INTERNAL MEDICINE
Payer: MEDICARE

## 2025-06-05 DIAGNOSIS — M25.562 LEFT KNEE PAIN, UNSPECIFIED CHRONICITY: Primary | ICD-10-CM

## 2025-06-05 DIAGNOSIS — M25.662 DECREASED ROM OF LEFT KNEE: ICD-10-CM

## 2025-06-05 DIAGNOSIS — M25.462 SWELLING OF LEFT KNEE JOINT: ICD-10-CM

## 2025-06-05 DIAGNOSIS — R29.898 WEAKNESS OF BOTH HIPS: ICD-10-CM

## 2025-06-05 DIAGNOSIS — R26.89 DECREASED FUNCTIONAL MOBILITY: ICD-10-CM

## 2025-06-05 PROCEDURE — 97140 MANUAL THERAPY 1/> REGIONS: CPT

## 2025-06-05 PROCEDURE — 97110 THERAPEUTIC EXERCISES: CPT

## 2025-06-05 PROCEDURE — 97112 NEUROMUSCULAR REEDUCATION: CPT

## 2025-06-10 ENCOUNTER — CLINICAL SUPPORT (OUTPATIENT)
Dept: REHABILITATION | Facility: HOSPITAL | Age: 70
End: 2025-06-10
Attending: INTERNAL MEDICINE
Payer: MEDICARE

## 2025-06-10 DIAGNOSIS — M25.562 LEFT KNEE PAIN, UNSPECIFIED CHRONICITY: Primary | ICD-10-CM

## 2025-06-10 DIAGNOSIS — R26.89 DECREASED FUNCTIONAL MOBILITY: ICD-10-CM

## 2025-06-10 DIAGNOSIS — R29.898 WEAKNESS OF BOTH HIPS: ICD-10-CM

## 2025-06-10 DIAGNOSIS — M25.662 DECREASED ROM OF LEFT KNEE: ICD-10-CM

## 2025-06-10 DIAGNOSIS — M25.462 SWELLING OF LEFT KNEE JOINT: ICD-10-CM

## 2025-06-10 PROCEDURE — 97140 MANUAL THERAPY 1/> REGIONS: CPT | Mod: CQ

## 2025-06-10 PROCEDURE — 97112 NEUROMUSCULAR REEDUCATION: CPT | Mod: CQ

## 2025-06-10 PROCEDURE — 97110 THERAPEUTIC EXERCISES: CPT | Mod: CQ

## 2025-06-10 NOTE — PROGRESS NOTES
Outpatient Rehab    Physical Therapy Visit    Patient Name: Jamal Gayle Jr.  MRN: [id number]  YOB: 1955  Encounter Date: 6/10/2025    Therapy Diagnosis:   Encounter Diagnoses   Name Primary?    Left knee pain, unspecified chronicity Yes    Swelling of left knee joint     Decreased functional mobility     Decreased ROM of left knee     Weakness of both hips      Physician: Dayan Osullivan MD    Physician Orders: Eval and Treat  Medical Diagnosis: Pain in left knee  Effusion, left knee  Surgical Diagnosis: Not applicable for this Episode   Surgical Date: Not applicable for this Episode    Visit # / Visits Authorized:  8 / 12  Insurance Authorization Period: 5/12/2025 to 7/4/2025  Date of Evaluation: 5/9/2025  Plan of Care Certification: 5/9/2025 to 7/4/2025      PT/PTA: PTA   Number of PTA visits since last PT visit:1  Time In: 0800   Time Out: 0840  Total Time (in minutes): 40   Total Billable Time (in minutes): 40    FOTO:  Intake Score:  %  Survey Score 2:  %  Survey Score 3:  %    Precautions:       Subjective   Pt states he has a great deal of increased magdi chirag the L knee and HS due to excessive amounts of lifting he has had to do at work. ([employer]) He may attempt to take leave to try and recover..  Pain reported as 9/10. (B)LE soreness    Objective            Treatment:  Therapeutic Exercise  TE 4: L LE strengthening exercises sitting with resistance (see exercise sheet)  Manual Therapy  MT 1: (B) hamstring, hip add, piri stretch  MT 5: L knee A/P mobilizations, patellar mobs, LAD, mobilization with movement  Balance/Neuromuscular Re-Education  NMR 1: posterior chain re-ed with alt UE/LE superman's (prone)  NMR 2: HS strength re-ed with prone HS curl  NMR 3: glute re-ed with bridge    Time Entry(in minutes):  Manual Therapy Time Entry: 20  Neuromuscular Re-Education Time Entry: 10  Therapeutic Exercise Time Entry: 10    Assessment & Plan   Assessment: Patient demonstrated a positive  outcome after session today, as evidenced by report of pain relief after session. He was able to complete some L LE exercises in supine and seated, and posterior chain exercises in prone. Some weight bearing exercises withheld due to report of greatly increased pain. Will continnue with normal POC pending stabilization of pain next session.  Evaluation/Treatment Tolerance: Patient limited by pain    The patient will continue to benefit from skilled outpatient physical therapy in order to address the deficits listed in the problem list on the initial evaluation, provide patient and family education, and maximize the patients level of independence in the home and community environments.     The patient's spiritual, cultural, and educational needs were considered, and the patient is agreeable to the plan of care and goals.           Plan: Ct with current PT POC. Set to D/C at end of POC.    Goals:   Active       Functional outcome       Patient will show a significant change in FOTO patient-reported outcome tool to demonstrate subjective improvement (Ongoing)       Start:  05/09/25    Expected End:  07/04/25               Pain       Patient will report L knee pain at worst of 1/10 demonstrating a reduction of overall pain (Ongoing)       Start:  05/09/25    Expected End:  07/04/25               Range of Motion       Patient will achieve left knee ROM of  0-130 degrees  (Ongoing)       Start:  05/09/25    Expected End:  07/04/25               Strength       Patient will achieve bilateral hip extension strength of 4+/5 (Ongoing)       Start:  05/09/25    Expected End:  07/04/25            Patient will achieve left knee flexion strength of 4+/5 (Ongoing)       Start:  05/09/25    Expected End:  07/04/25                Cruz Reynolds, PTA

## 2025-06-12 ENCOUNTER — CLINICAL SUPPORT (OUTPATIENT)
Dept: REHABILITATION | Facility: HOSPITAL | Age: 70
End: 2025-06-12
Attending: INTERNAL MEDICINE
Payer: MEDICARE

## 2025-06-12 DIAGNOSIS — R29.898 WEAKNESS OF BOTH HIPS: ICD-10-CM

## 2025-06-12 DIAGNOSIS — R26.89 DECREASED FUNCTIONAL MOBILITY: ICD-10-CM

## 2025-06-12 DIAGNOSIS — M25.562 LEFT KNEE PAIN, UNSPECIFIED CHRONICITY: Primary | ICD-10-CM

## 2025-06-12 DIAGNOSIS — M25.662 DECREASED ROM OF LEFT KNEE: ICD-10-CM

## 2025-06-12 DIAGNOSIS — M25.462 SWELLING OF LEFT KNEE JOINT: ICD-10-CM

## 2025-06-12 PROCEDURE — 97112 NEUROMUSCULAR REEDUCATION: CPT | Mod: CQ

## 2025-06-12 PROCEDURE — 97140 MANUAL THERAPY 1/> REGIONS: CPT | Mod: CQ

## 2025-06-12 PROCEDURE — 97110 THERAPEUTIC EXERCISES: CPT | Mod: CQ

## 2025-06-12 NOTE — PROGRESS NOTES
Outpatient Rehab    Physical Therapy Visit    Patient Name: Jamal Gayle Jr.  MRN: [id number]  YOB: 1955  Encounter Date: 6/12/2025    Therapy Diagnosis:   Encounter Diagnoses   Name Primary?    Left knee pain, unspecified chronicity Yes    Swelling of left knee joint     Decreased functional mobility     Decreased ROM of left knee     Weakness of both hips      Physician: Dayan Osullivan MD    Physician Orders: Eval and Treat  Medical Diagnosis: Pain in left knee  Effusion, left knee  Surgical Diagnosis: Not applicable for this Episode   Surgical Date: Not applicable for this Episode    Visit # / Visits Authorized:  9 / 12  Insurance Authorization Period: 5/12/2025 to 7/4/2025  Date of Evaluation: 5/9/2025  Plan of Care Certification: 5/9/2025 to 7/4/2025      PT/PTA: PTA   Number of PTA visits since last PT visit:2  Time In: 0800   Time Out: 0845  Total Time (in minutes): 45   Total Billable Time (in minutes): 45    FOTO:  Intake Score:  %  Survey Score 2:  %  Survey Score 3:  %    Precautions:       Subjective   Patient stated he did not do much lifting yesterday but continues to have the same pain behind the knee and in the hamstring..  Pain reported as 3/10. left hamstring    Objective            Treatment:  Therapeutic Exercise  TE 2: balance and LE strength/stability with standing hip flexion, abd, and ext (B)  TE 3: Modified Golfer's lifts (8#) for SL stability  Manual Therapy  MT 1: (B) hamstring, hip add, piri stretch  MT 2: Left sciatic nerve glides  MT 3: prone quad and HF stretch  MT 6: soft tissue mobilizations to the left hamstring and gastroc with trigger point release  Balance/Neuromuscular Re-Education  NMR 1: posterior chain re-ed with alt UE/LE superman's (prone)  NMR 2: HS strength re-ed with prone HS curl  NMR 3: glute re-ed with single LE bridges    Time Entry(in minutes):  Manual Therapy Time Entry: 15  Neuromuscular Re-Education Time Entry: 15  Therapeutic Exercise Time  Entry: 15    Assessment & Plan   Assessment: Patient demonstrated a (+) outcome after session today, as evidenced by good tolerance with increased repetitions..He did have mild left anterior knee pain during golfers lift exercise. Addressed increased tightness in left gastroc and hamstring with soft tissue mobilizations. Multiple trigger points noted requiring active release technique with good response. Will continnue with normal POC pending stabilization of pain next session.  Evaluation/Treatment Tolerance: Patient tolerated treatment well    The patient will continue to benefit from skilled outpatient physical therapy in order to address the deficits listed in the problem list on the initial evaluation, provide patient and family education, and maximize the patients level of independence in the home and community environments.     The patient's spiritual, cultural, and educational needs were considered, and the patient is agreeable to the plan of care and goals.     Education  Education was done with Patient. The patient's learning style includes Listening. The patient Verbalizes understanding.         Educated patient to continue HEP to address deficits mentioned.        Plan: Ct with current PT POC. Set to D/C at end of POC.    Goals:   Active       Functional outcome       Patient will show a significant change in FOTO patient-reported outcome tool to demonstrate subjective improvement (Progressing)       Start:  05/09/25    Expected End:  07/04/25               Pain       Patient will report L knee pain at worst of 1/10 demonstrating a reduction of overall pain (Progressing)       Start:  05/09/25    Expected End:  07/04/25               Range of Motion       Patient will achieve left knee ROM of  0-130 degrees  (Progressing)       Start:  05/09/25    Expected End:  07/04/25               Strength       Patient will achieve bilateral hip extension strength of 4+/5 (Progressing)       Start:  05/09/25     Expected End:  07/04/25            Patient will achieve left knee flexion strength of 4+/5 (Progressing)       Start:  05/09/25    Expected End:  07/04/25                Anna Marie Mehta PTA

## 2025-06-17 ENCOUNTER — CLINICAL SUPPORT (OUTPATIENT)
Dept: REHABILITATION | Facility: HOSPITAL | Age: 70
End: 2025-06-17
Attending: INTERNAL MEDICINE
Payer: MEDICARE

## 2025-06-17 DIAGNOSIS — M25.662 DECREASED ROM OF LEFT KNEE: ICD-10-CM

## 2025-06-17 DIAGNOSIS — R29.898 WEAKNESS OF BOTH HIPS: ICD-10-CM

## 2025-06-17 DIAGNOSIS — R26.89 DECREASED FUNCTIONAL MOBILITY: ICD-10-CM

## 2025-06-17 DIAGNOSIS — M25.562 LEFT KNEE PAIN, UNSPECIFIED CHRONICITY: Primary | ICD-10-CM

## 2025-06-17 DIAGNOSIS — M25.462 SWELLING OF LEFT KNEE JOINT: ICD-10-CM

## 2025-06-17 PROCEDURE — 97140 MANUAL THERAPY 1/> REGIONS: CPT | Mod: CQ

## 2025-06-17 PROCEDURE — 97110 THERAPEUTIC EXERCISES: CPT | Mod: CQ

## 2025-06-17 PROCEDURE — 97112 NEUROMUSCULAR REEDUCATION: CPT | Mod: CQ

## 2025-06-17 NOTE — PROGRESS NOTES
Outpatient Rehab    Physical Therapy Visit    Patient Name: Jamal Gayle Jr.  MRN: [id number]  YOB: 1955  Encounter Date: 6/17/2025    Therapy Diagnosis:   Encounter Diagnoses   Name Primary?    Left knee pain, unspecified chronicity Yes    Swelling of left knee joint     Decreased functional mobility     Decreased ROM of left knee     Weakness of both hips      Physician: Dayan Osullivan MD    Physician Orders: Eval and Treat  Medical Diagnosis: Pain in left knee  Effusion, left knee  Surgical Diagnosis: Not applicable for this Episode   Surgical Date: Not applicable for this Episode  Days Since Last Surgery: Not applicable for this Episode    Visit # / Visits Authorized:  10 / 12  Insurance Authorization Period: 5/12/2025 to 7/4/2025  Date of Evaluation: 5/9/2025  Plan of Care Certification: 5/9/2025 to 7/4/2025      PT/PTA: PTA   Number of PTA visits since last PT visit:3  Time In: 0800   Time Out: 0845  Total Time (in minutes): 45   Total Billable Time (in minutes): 45    FOTO:  Intake Score:  %  Survey Score 2:  %  Survey Score 3:  %    Precautions:       Subjective   Patient reports he doesn't have any pain in the left hamstring but during exercises he did have increasing pain in the front of his left knee and he stated it's just the arthritis..  Pain reported as 0/10. left hamstring; 4/10 left knee joint with activity    Objective            Treatment:  Therapeutic Exercise  TE 2: balance and LE strength/stability with standing hip flexion, abd, and ext (B)  TE 3: Modified Golfer's lifts (8#) for SL stability  Manual Therapy  MT 1: (B) hamstring, hip add, piri stretch  MT 2: Left sciatic nerve glides  MT 3: prone quad and HF stretch  MT 5: L knee A/P mobilizations, patellar mobs, LAD, mobilization with movement  Balance/Neuromuscular Re-Education  NMR 1: posterior chain re-ed with alt UE/LE superman's (prone)  NMR 2: HS strength re-ed with prone HS curl  NMR 3: glute re-ed with single LE  nelson    Time Entry(in minutes):  Manual Therapy Time Entry: 15  Neuromuscular Re-Education Time Entry: 15  Therapeutic Exercise Time Entry: 15    Assessment & Plan   Assessment: Patient demonstrated a (+) outcome after session today, as evidenced by decreased pain level in left hamstring. However he does continue to have pain in left knee joint with single limb strengthening exercises. He is progressing well with pain level during performance of heavy lifting activities outside of PT session. Will continue with normal POC pending stabilization of pain next session.  Evaluation/Treatment Tolerance: Patient tolerated treatment well    The patient will continue to benefit from skilled outpatient physical therapy in order to address the deficits listed in the problem list on the initial evaluation, provide patient and family education, and maximize the patients level of independence in the home and community environments.     The patient's spiritual, cultural, and educational needs were considered, and the patient is agreeable to the plan of care and goals.     Education  Education was done with Patient. The patient's learning style includes Listening. The patient Verbalizes understanding.         Educated patient to continue HEP to address deficits mentioned.        Plan: Ct with current PT POC. Set to D/C at end of POC.    Goals:   Active       Functional outcome       Patient will show a significant change in FOTO patient-reported outcome tool to demonstrate subjective improvement (Progressing)       Start:  05/09/25    Expected End:  07/04/25               Pain       Patient will report L knee pain at worst of 1/10 demonstrating a reduction of overall pain (Progressing)       Start:  05/09/25    Expected End:  07/04/25               Range of Motion       Patient will achieve left knee ROM of  0-130 degrees  (Progressing)       Start:  05/09/25    Expected End:  07/04/25               Strength       Patient will  achieve bilateral hip extension strength of 4+/5 (Progressing)       Start:  05/09/25    Expected End:  07/04/25            Patient will achieve left knee flexion strength of 4+/5 (Progressing)       Start:  05/09/25    Expected End:  07/04/25                Anna Marie Mehta PTA

## 2025-06-19 ENCOUNTER — CLINICAL SUPPORT (OUTPATIENT)
Dept: REHABILITATION | Facility: HOSPITAL | Age: 70
End: 2025-06-19
Attending: INTERNAL MEDICINE
Payer: MEDICARE

## 2025-06-19 DIAGNOSIS — M25.662 DECREASED ROM OF LEFT KNEE: ICD-10-CM

## 2025-06-19 DIAGNOSIS — M25.462 SWELLING OF LEFT KNEE JOINT: ICD-10-CM

## 2025-06-19 DIAGNOSIS — R29.898 WEAKNESS OF BOTH HIPS: ICD-10-CM

## 2025-06-19 DIAGNOSIS — M25.562 LEFT KNEE PAIN, UNSPECIFIED CHRONICITY: Primary | ICD-10-CM

## 2025-06-19 DIAGNOSIS — R26.89 DECREASED FUNCTIONAL MOBILITY: ICD-10-CM

## 2025-06-19 PROCEDURE — 97112 NEUROMUSCULAR REEDUCATION: CPT

## 2025-06-19 PROCEDURE — 97140 MANUAL THERAPY 1/> REGIONS: CPT

## 2025-06-19 PROCEDURE — 97110 THERAPEUTIC EXERCISES: CPT

## 2025-06-19 NOTE — PROGRESS NOTES
Outpatient Rehab    Physical Therapy Visit    Patient Name: Jamal Gayle Jr.  MRN: [id number]  YOB: 1955  Encounter Date: 6/19/2025    Therapy Diagnosis:   Encounter Diagnoses   Name Primary?    Left knee pain, unspecified chronicity Yes    Swelling of left knee joint     Decreased functional mobility     Decreased ROM of left knee     Weakness of both hips      Physician: Dayan Osullivan MD    Physician Orders: Eval and Treat  Medical Diagnosis: Pain in left knee  Effusion, left knee  Surgical Diagnosis: Not applicable for this Episode   Surgical Date: Not applicable for this Episode  Days Since Last Surgery: Not applicable for this Episode    Visit # / Visits Authorized:  11 / 12  Insurance Authorization Period: 5/12/2025 to 7/4/2025  Date of Evaluation: 5/9/2025  Plan of Care Certification: 5/9/2025 to 7/4/2025      PT/PTA: PT   Number of PTA visits since last PT visit:3  Time In: 0800   Time Out: 0845  Total Time (in minutes): 45   Total Billable Time (in minutes): 42      Subjective   The pt reports no pain currently, but states he has not been lifting things at work..  Pain reported as 0/10.      Objective            Treatment:  Therapeutic Exercise  TE 1: lunge position holds with black TB resistance for core activation  TE 2: step ups with med ball raises for balance and LE strength  Manual Therapy  MT 1: (B) hamstring, hip add, piri stretch  MT 2: Left sciatic nerve glides  MT 3: prone quad and HF stretch  MT 4: (B) posterior hip mobs  Balance/Neuromuscular Re-Education  NMR 1: split stance sit to stands with 18 lbs kettlebell for balance and endurance re-ed  NMR 2: lateral steps with red TB resistance for balance re-ed    Time Entry(in minutes):  Manual Therapy Time Entry: 12  Neuromuscular Re-Education Time Entry: 15  Therapeutic Exercise Time Entry: 15    Assessment & Plan   Assessment: The pt reported fatigue at completion of tx session today. He also had more difficulty during step  up activity with LLE as compared to R. He also had observable sway during lunge position band press outs (black) with BLE (L>R). He acknowledges he will be D/C next week.        The patient will continue to benefit from skilled outpatient physical therapy in order to address the deficits listed in the problem list on the initial evaluation, provide patient and family education, and maximize the patients level of independence in the home and community environments.     The patient's spiritual, cultural, and educational needs were considered, and the patient is agreeable to the plan of care and goals.           Plan: Ct with current PT POC. Set to D/C at end of authorized visits.    Goals:   Active       Functional outcome       Patient will show a significant change in FOTO patient-reported outcome tool to demonstrate subjective improvement (Progressing)       Start:  05/09/25    Expected End:  07/04/25               Pain       Patient will report L knee pain at worst of 1/10 demonstrating a reduction of overall pain (Progressing)       Start:  05/09/25    Expected End:  07/04/25               Range of Motion       Patient will achieve left knee ROM of  0-130 degrees  (Progressing)       Start:  05/09/25    Expected End:  07/04/25               Strength       Patient will achieve bilateral hip extension strength of 4+/5 (Progressing)       Start:  05/09/25    Expected End:  07/04/25            Patient will achieve left knee flexion strength of 4+/5 (Progressing)       Start:  05/09/25    Expected End:  07/04/25                Zain Mendez, PT

## 2025-06-24 ENCOUNTER — CLINICAL SUPPORT (OUTPATIENT)
Dept: REHABILITATION | Facility: HOSPITAL | Age: 70
End: 2025-06-24
Attending: INTERNAL MEDICINE
Payer: MEDICARE

## 2025-06-24 DIAGNOSIS — R26.89 DECREASED FUNCTIONAL MOBILITY: ICD-10-CM

## 2025-06-24 DIAGNOSIS — M25.462 SWELLING OF LEFT KNEE JOINT: ICD-10-CM

## 2025-06-24 DIAGNOSIS — M25.562 LEFT KNEE PAIN, UNSPECIFIED CHRONICITY: Primary | ICD-10-CM

## 2025-06-24 DIAGNOSIS — R29.898 WEAKNESS OF BOTH HIPS: ICD-10-CM

## 2025-06-24 DIAGNOSIS — M25.662 DECREASED ROM OF LEFT KNEE: ICD-10-CM

## 2025-06-24 PROCEDURE — 97112 NEUROMUSCULAR REEDUCATION: CPT

## 2025-06-24 PROCEDURE — 97140 MANUAL THERAPY 1/> REGIONS: CPT

## 2025-06-24 PROCEDURE — 97110 THERAPEUTIC EXERCISES: CPT

## 2025-06-24 NOTE — PROGRESS NOTES
Outpatient Rehab    Physical Therapy Discharge    Patient Name: Jamal Gayle Jr.  MRN: [id number]  YOB: 1955  Encounter Date: 6/24/2025    Therapy Diagnosis:   Encounter Diagnoses   Name Primary?    Left knee pain, unspecified chronicity Yes    Swelling of left knee joint     Decreased functional mobility     Decreased ROM of left knee     Weakness of both hips      Physician: Dayan Osullivan MD    Physician Orders: Eval and Treat  Medical Diagnosis: Pain in left knee  Effusion, left knee  Surgical Diagnosis: Not applicable for this Episode   Surgical Date: Not applicable for this Episode  Days Since Last Surgery: Not applicable for this Episode    Visit # / Visits Authorized:  12 / 12  Insurance Authorization Period: 5/12/2025 to 7/4/2025  Date of Evaluation: 5/9/2025  Plan of Care Certification: 5/9/2025 to 7/4/2025      PT/PTA: PT   Number of PTA visits since last PT visit:0  Time In: 0930   Time Out: 1010  Total Time (in minutes): 40   Total Billable Time (in minutes): 40    FOTO:  Intake Score: 64%  Survey Score 2: 54%  Survey Score 3: 59%    Precautions:       Subjective   The pt states he worked many hours last week and that his knee pain was minimal to none..  Pain reported as 0/10.      Objective       Knee Range of Motion   Right Knee   Active (deg) Passive (deg) Pain   Flexion 130       Extension 0           Left Knee   Active (deg) Passive (deg) Pain   Flexion 128   Yes   Extension 0                          Hip Strength - Planes of Motion   Right Strength Right Pain Left Strength Left  Pain   Flexion (L2) 4+   4+     Extension 4   4     ABduction 4   4     ADduction 4   4     Internal Rotation           External Rotation 4+   4+         Knee Strength   Right Strength Right Pain Left Strength Left  Pain   Flexion (S2) 4+   4     Prone Flexion           Extension (L3) 4+   4                   Treatment:  Therapeutic Exercise  TE 1: hip stability/strengthening with standing hip abd,ext,  add, and flexion  TE 2: LE strength/stability with fw, lat, and retro lunges  Manual Therapy  MT 1: (B) hamstring, hip add, piri stretch  MT 2: Left sciatic nerve glides  MT 3: (B) posterior hip mobs  Balance/Neuromuscular Re-Education  NMR 1: posterior chain re-ed with prone superman and prone HS curls (8#)  NMR 2: glute re-ed with bridges    Time Entry(in minutes):  Manual Therapy Time Entry: 10  Neuromuscular Re-Education Time Entry: 15  Therapeutic Exercise Time Entry: 15    Assessment & Plan   Assessment: The pt has progressed very well with pain reports, LE strength, endurance, and flexibility since his SOC with PT. He has been compliant with his HEP as well as his PT sessions. He was provided with an updated HEP at conclusion of tx session and will be D/C at this time. He was encouraged to phone the clinic with any PT related needs.       The patient's spiritual, cultural, and educational needs were considered, and the patient is agreeable to the plan of care and goals.     Education  Education was done with Patient. The patient's learning style includes Demonstration and Listening. The patient Verbalizes understanding and Demonstrates understanding.         Printed HEP of standing hip abd, flexion, add, and ext, bridges, prone superman, prone HS curls, and fw, lateral, and retro lunges       Plan: D/C to HEP.    Goals:   Active       Range of Motion       Patient will achieve left knee ROM of  0-130 degrees  (Progressing)       Start:  05/09/25    Expected End:  07/04/25               Strength       Patient will achieve bilateral hip extension strength of 4+/5 (Progressing)       Start:  05/09/25    Expected End:  07/04/25            Patient will achieve left knee flexion strength of 4+/5 (Met)       Start:  05/09/25    Expected End:  07/04/25    Resolved:  06/24/25           Resolved       Functional outcome       Patient will show a significant change in FOTO patient-reported outcome tool to demonstrate  subjective improvement (Met)       Start:  05/09/25    Expected End:  07/04/25    Resolved:  06/24/25            Pain       Patient will report L knee pain at worst of 1/10 demonstrating a reduction of overall pain (Met)       Start:  05/09/25    Expected End:  07/04/25    Resolved:  06/24/25             Zain Mendez PT

## 2025-06-26 ENCOUNTER — RESULTS FOLLOW-UP (OUTPATIENT)
Dept: PRIMARY CARE CLINIC | Facility: CLINIC | Age: 70
End: 2025-06-26

## 2025-06-26 LAB
CHOLEST SERPL-MCNC: 183 MG/DL
HDLC SERPL-MCNC: 48 MG/DL (ref 35–70)
LDLC SERPL CALC-MCNC: 95 MG/DL (ref 0–160)
TRIGL SERPL-MCNC: 234 MG/DL (ref 40–160)
VLDLC SERPL-MCNC: 40 MG/DL

## 2025-06-30 ENCOUNTER — TELEPHONE (OUTPATIENT)
Dept: PRIMARY CARE CLINIC | Facility: CLINIC | Age: 70
End: 2025-06-30
Payer: MEDICARE

## 2025-06-30 NOTE — TELEPHONE ENCOUNTER
----- Message from Dayan Osullivan MD sent at 6/26/2025  6:27 PM CDT -----  Liver and kidney look good. Calcium is low, he needs to supplement but get it with Vitamin D so it drives it into bones not cholesterol plaque.   ----- Message -----  From: Tripp Manzano  Sent: 6/26/2025   2:10 PM CDT  To: Dayan Osullivan MD

## 2025-08-06 ENCOUNTER — OFFICE VISIT (OUTPATIENT)
Dept: PRIMARY CARE CLINIC | Facility: CLINIC | Age: 70
End: 2025-08-06
Payer: MEDICARE

## 2025-08-06 VITALS
HEIGHT: 67 IN | RESPIRATION RATE: 16 BRPM | DIASTOLIC BLOOD PRESSURE: 78 MMHG | WEIGHT: 223 LBS | SYSTOLIC BLOOD PRESSURE: 122 MMHG | HEART RATE: 80 BPM | BODY MASS INDEX: 35 KG/M2 | OXYGEN SATURATION: 94 % | TEMPERATURE: 98 F

## 2025-08-06 DIAGNOSIS — I25.10 ARTERIOSCLEROSIS OF CORONARY ARTERY: ICD-10-CM

## 2025-08-06 DIAGNOSIS — E66.09 CLASS 1 OBESITY DUE TO EXCESS CALORIES WITH SERIOUS COMORBIDITY AND BODY MASS INDEX (BMI) OF 34.0 TO 34.9 IN ADULT: ICD-10-CM

## 2025-08-06 DIAGNOSIS — Z82.49 FAMILY HISTORY OF ABDOMINAL AORTIC ANEURYSM: ICD-10-CM

## 2025-08-06 DIAGNOSIS — I10 PRIMARY HYPERTENSION: Primary | ICD-10-CM

## 2025-08-06 DIAGNOSIS — J44.89 COPD WITH ASTHMA: ICD-10-CM

## 2025-08-06 DIAGNOSIS — R09.89 ABDOMINAL BRUIT: ICD-10-CM

## 2025-08-06 DIAGNOSIS — I73.9 PAD (PERIPHERAL ARTERY DISEASE): ICD-10-CM

## 2025-08-06 DIAGNOSIS — E66.811 CLASS 1 OBESITY DUE TO EXCESS CALORIES WITH SERIOUS COMORBIDITY AND BODY MASS INDEX (BMI) OF 34.0 TO 34.9 IN ADULT: ICD-10-CM

## 2025-08-06 PROCEDURE — 4010F ACE/ARB THERAPY RXD/TAKEN: CPT | Mod: CPTII,,, | Performed by: INTERNAL MEDICINE

## 2025-08-06 PROCEDURE — 99214 OFFICE O/P EST MOD 30 MIN: CPT | Mod: ,,, | Performed by: INTERNAL MEDICINE

## 2025-08-06 PROCEDURE — 1160F RVW MEDS BY RX/DR IN RCRD: CPT | Mod: CPTII,,, | Performed by: INTERNAL MEDICINE

## 2025-08-06 PROCEDURE — 3288F FALL RISK ASSESSMENT DOCD: CPT | Mod: CPTII,,, | Performed by: INTERNAL MEDICINE

## 2025-08-06 PROCEDURE — 1101F PT FALLS ASSESS-DOCD LE1/YR: CPT | Mod: CPTII,,, | Performed by: INTERNAL MEDICINE

## 2025-08-06 PROCEDURE — 1157F ADVNC CARE PLAN IN RCRD: CPT | Mod: CPTII,,, | Performed by: INTERNAL MEDICINE

## 2025-08-06 PROCEDURE — 1159F MED LIST DOCD IN RCRD: CPT | Mod: CPTII,,, | Performed by: INTERNAL MEDICINE

## 2025-08-06 PROCEDURE — 3078F DIAST BP <80 MM HG: CPT | Mod: CPTII,,, | Performed by: INTERNAL MEDICINE

## 2025-08-06 PROCEDURE — 3008F BODY MASS INDEX DOCD: CPT | Mod: CPTII,,, | Performed by: INTERNAL MEDICINE

## 2025-08-06 PROCEDURE — 3074F SYST BP LT 130 MM HG: CPT | Mod: CPTII,,, | Performed by: INTERNAL MEDICINE

## 2025-08-06 PROCEDURE — 1125F AMNT PAIN NOTED PAIN PRSNT: CPT | Mod: CPTII,,, | Performed by: INTERNAL MEDICINE

## 2025-08-06 NOTE — PROGRESS NOTES
Dayan Osullivan MD   [address] [place] [address]     Patient ID: [id number]     Chief Complaint: 6 Months follow up for medication management        HPI:     Eugene Gayle Jr. is a 70 y.o. male here today for a follow up of HTN, CAD, COPD, Prediabetes and ASHA. Therapy did a really good job on his knee. He still has pain but he's doing much better, his leg got much stronger. He has everything set up at home to keep doing his home exercise program. No other complaints today.       Subjective:     Review of Systems   HENT:          Sinus is doing good with his Zyrtec.    Cardiovascular:         Saw Igor, next year he'll have to do a stress.    Genitourinary:         Has been seeing Gino Mark at [facility], He likes him but he's a bit young   Musculoskeletal:         As per HPI   Skin:         He saw Dr Coats and he takes off the great toenail but it keeps growing back.    Psychiatric/Behavioral: Negative.         Past Medical History:   Diagnosis Date    CAD (coronary artery disease)     Cataract of both eyes 05/17/2022    GERD (gastroesophageal reflux disease)     Hyperlipidemia     Hypertension     Obesity, unspecified     Orchitis and epididymitis 05/23/2022    ASHA on CPAP     PAD (peripheral artery disease) 08/19/2024    Partial thickness rotator cuff tear     Personal history of colonic polyps 10/24/2019    Mandi Hobbs MD  [facility]    Pneumonia, unspecified organism     Postprandial diarrhea     Prediabetes     Skin cancer     Tubular adenoma of colon 11/22/2022        Past Surgical History:   Procedure Laterality Date    APPENDECTOMY      BRONCHOSCOPY      CARDIAC CATHETERIZATION      COLONOSCOPY  10/24/2019    Mandi Hobbs MD  [facility]    COLONOSCOPY W/ BIOPSIES  11/16/2022    FINGER SURGERY      ROTATOR CUFF REPAIR  05/24/2018    SINUS SURGERY         Family History   Problem Relation Name Age of Onset    COPD Mother      Hypertension  Mother      Heart disease Mother      Kidney failure Mother      Hypertension Father      Lung cancer Father      Prostate cancer Father      Brain aneurysm Sister      COPD Brother      Aortic aneurysm Brother           from rupture age 50    Brain aneurysm Brother          Social History[1]    Review of patient's allergies indicates:   Allergen Reactions    Ciprofloxacin Itching and Rash       Outpatient Medications Marked as Taking for the 25 encounter (Office Visit) with Dayan Osullivan MD   Medication Sig Dispense Refill    acetaminophen (TYLENOL 8 HOUR ORAL) Take by mouth.      aspirin (ECOTRIN) 81 MG EC tablet Take 81 mg by mouth once daily.      cetirizine (ZYRTEC) 10 MG tablet Take 10 mg by mouth once daily.      CIALIS 20 mg Tab TAKE NO MORE THAN 1 TABLET BY MOUTH EVERY 48 HOURS      cinnamon bark (CINNAMON ORAL) Take by mouth. Taking ceylon cinnamon      esomeprazole (NEXIUM) 20 MG capsule Take 20 mg by mouth before breakfast.      glucosamine-chondroitin 500-400 mg tablet Take 1 tablet by mouth 3 (three) times daily.      lisinopriL (PRINIVIL,ZESTRIL) 20 MG tablet Take 20 mg by mouth once daily.      lovastatin (MEVACOR) 40 MG tablet TAKE 1 TABLET EVERY NIGHT 90 tablet 3    magnesium oxide (MAG-OX) 400 mg (241.3 mg magnesium) tablet Take 400 mg by mouth once daily.      tamsulosin (FLOMAX) 0.4 mg Cp24       vitamin E 200 UNIT capsule Take 200 Units by mouth once daily.         Patient Care Team:  Dayan Osullivan MD as PCP - General (Internal Medicine)  Derrick Costa MD as Consulting Physician (Cardiology)  Mandi Hobbs III, MD as Consulting Physician (Gastroenterology)  Ronny Ross MD as Consulting Physician (Pulmonary Disease)  Boom Casper Jr., MD as Consulting Physician (Dermatology)  Peyton Marte PA-C (Dermatology)  Dane Polanco MD as Consulting Physician (Urology)  Stan Landry MD as Consulting Physician (Orthopedic Surgery)  Reji Coats DPM  "(Podiatry)  Gino Zamudio MD as Consulting Physician (Urology)       Objective:     /78   Pulse 80   Temp 98 °F (36.7 °C) (Oral)   Resp 16   Ht 5' 7" (1.702 m)   Wt 101.2 kg (223 lb)   SpO2 (!) 94%   BMI 34.93 kg/m²     Physical Exam  Vitals reviewed.   Cardiovascular:      Rate and Rhythm: Normal rate and regular rhythm.      Heart sounds:      No gallop.   Pulmonary:      Effort: Pulmonary effort is normal.      Breath sounds: Normal breath sounds. No wheezing.   Abdominal:      General: Bowel sounds are normal.      Palpations: Abdomen is soft.      Comments: ? bruit   Musculoskeletal:         General: Tenderness present. No swelling.   Skin:     General: Skin is warm and dry.      Comments: His nails keep growing out, they revised his right 5th finger multiple times, he did toes twice with different doctors   Neurological:      Mental Status: He is alert.   Psychiatric:         Mood and Affect: Mood normal.         Behavior: Behavior normal.         Thought Content: Thought content normal.         Judgment: Judgment normal.               Assessment/Plan:     1. Primary hypertension  Comments:  Continue lisinopril 20 mg daily, at goal    2. PAD (peripheral artery disease)  Comments:  No recent studies but not reporting any claudication, will defer to Dr Costa but need his last visit, will request  Orders:  -     US Abdominal Aorta; Future; Expected date: 08/06/2025    3. Arteriosclerosis of coronary artery  Comments:  Plans for stress next year, continue Lovastatin. Will get lab just done for cardiology    4. COPD with asthma  Comments:  Doing well doesn't use inhalers    5. Class 1 obesity due to excess calories with serious comorbidity and body mass index (BMI) of 34.0 to 34.9 in adult  Comments:  Weight has come down, therapy may have helped that as well    6. Abdominal bruit  Comments:  Multiple family members with aneurysm, did check 2023  Orders:  -     US Abdominal Aorta; Future; Expected " date: 08/06/2025    7. Family history of abdominal aortic aneurysm  -     US Abdominal Aorta; Future; Expected date: 08/06/2025             Follow up in about 4 months (around 12/11/2025) for Wellness. In addition to their scheduled follow up, the patient has also been instructed to follow up on as needed basis.     Signature:  Dayan Osullivan MD  [facility]  [address] [place] [address]         [1]   Social History  Socioeconomic History    Marital status: Single   Tobacco Use    Smoking status: Former    Smokeless tobacco: Never   Substance and Sexual Activity    Alcohol use: Yes     Alcohol/week: 2.0 standard drinks of alcohol     Types: 2 Cans of beer per week    Drug use: No    Sexual activity: Yes     Social Drivers of Health     Financial Resource Strain: Patient Declined (8/6/2025)    Overall Financial Resource Strain (CARDIA)     Difficulty of Paying Living Expenses: Patient declined   Food Insecurity: Patient Declined (8/6/2025)    Hunger Vital Sign     Worried About Running Out of Food in the Last Year: Patient declined     Ran Out of Food in the Last Year: Patient declined   Transportation Needs: Patient Declined (8/6/2025)    PRAPARE - Transportation     Lack of Transportation (Medical): Patient declined     Lack of Transportation (Non-Medical): Patient declined   Physical Activity: Sufficiently Active (8/6/2025)    Exercise Vital Sign     Days of Exercise per Week: 5 days     Minutes of Exercise per Session: 150+ min   Stress: No Stress Concern Present (8/6/2025)    [employer] [place] [employer] - Occupational Stress Questionnaire     Feeling of Stress : Not at all   Housing Stability: Low Risk  (8/6/2025)    Housing Stability Vital Sign     Unable to Pay for Housing in the Last Year: No     Number of Times Moved in the Last Year: 0     Homeless in the Last Year: No

## 2025-08-11 ENCOUNTER — DOCUMENTATION ONLY (OUTPATIENT)
Dept: PRIMARY CARE CLINIC | Facility: CLINIC | Age: 70
End: 2025-08-11
Payer: MEDICARE

## 2025-08-20 ENCOUNTER — HOSPITAL ENCOUNTER (OUTPATIENT)
Dept: RADIOLOGY | Facility: HOSPITAL | Age: 70
Discharge: HOME | End: 2025-08-20
Attending: INTERNAL MEDICINE
Payer: MEDICARE

## 2025-08-20 DIAGNOSIS — R09.89 ABDOMINAL BRUIT: ICD-10-CM

## 2025-08-20 DIAGNOSIS — I73.9 PAD (PERIPHERAL ARTERY DISEASE): ICD-10-CM

## 2025-08-20 DIAGNOSIS — Z82.49 FAMILY HISTORY OF ABDOMINAL AORTIC ANEURYSM: ICD-10-CM

## 2025-08-20 PROCEDURE — 76775 US EXAM ABDO BACK WALL LIM: CPT | Mod: TC

## 2025-08-21 ENCOUNTER — TELEPHONE (OUTPATIENT)
Dept: PRIMARY CARE CLINIC | Facility: CLINIC | Age: 70
End: 2025-08-21
Payer: MEDICARE